# Patient Record
Sex: MALE | Race: WHITE | NOT HISPANIC OR LATINO | Employment: FULL TIME | URBAN - METROPOLITAN AREA
[De-identification: names, ages, dates, MRNs, and addresses within clinical notes are randomized per-mention and may not be internally consistent; named-entity substitution may affect disease eponyms.]

---

## 2019-02-15 ENCOUNTER — OFFICE VISIT CONVERTED (OUTPATIENT)
Dept: NEUROLOGY | Facility: CLINIC | Age: 64
End: 2019-02-15
Attending: PSYCHIATRY & NEUROLOGY

## 2019-08-21 ENCOUNTER — HOSPITAL ENCOUNTER (OUTPATIENT)
Dept: CARDIOLOGY | Facility: HOSPITAL | Age: 64
Discharge: HOME | End: 2019-08-21

## 2021-05-16 VITALS
WEIGHT: 230.31 LBS | SYSTOLIC BLOOD PRESSURE: 127 MMHG | DIASTOLIC BLOOD PRESSURE: 82 MMHG | HEIGHT: 70 IN | HEART RATE: 78 BPM | BODY MASS INDEX: 32.97 KG/M2

## 2021-10-19 ENCOUNTER — TRANSCRIBE ORDERS (OUTPATIENT)
Dept: CARDIOLOGY | Facility: HOSPITAL | Age: 66
End: 2021-10-19

## 2021-10-19 ENCOUNTER — HOSPITAL ENCOUNTER (OUTPATIENT)
Dept: CARDIOLOGY | Facility: HOSPITAL | Age: 66
Discharge: HOME | End: 2021-10-19
Admitting: REGISTERED NURSE

## 2021-10-19 DIAGNOSIS — F90.0 ADHD, PREDOMINANTLY INATTENTIVE TYPE: Primary | ICD-10-CM

## 2021-10-19 DIAGNOSIS — F90.0 ADHD, PREDOMINANTLY INATTENTIVE TYPE: ICD-10-CM

## 2021-10-19 DIAGNOSIS — Z79.899 ENCOUNTER FOR LONG-TERM (CURRENT) USE OF OTHER MEDICATIONS: ICD-10-CM

## 2021-10-19 PROCEDURE — 93005 ELECTROCARDIOGRAM TRACING: CPT | Performed by: REGISTERED NURSE

## 2021-10-25 LAB — QT INTERVAL: 354 MS

## 2021-11-15 ENCOUNTER — OFFICE VISIT (OUTPATIENT)
Dept: CARDIOLOGY | Facility: CLINIC | Age: 66
End: 2021-11-15

## 2021-11-15 VITALS
DIASTOLIC BLOOD PRESSURE: 88 MMHG | HEIGHT: 70 IN | BODY MASS INDEX: 33.36 KG/M2 | SYSTOLIC BLOOD PRESSURE: 143 MMHG | HEART RATE: 86 BPM | WEIGHT: 233 LBS

## 2021-11-15 DIAGNOSIS — R94.31 ABNORMAL ECG: Primary | ICD-10-CM

## 2021-11-15 DIAGNOSIS — F90.9 ATTENTION DEFICIT HYPERACTIVITY DISORDER (ADHD), UNSPECIFIED ADHD TYPE: ICD-10-CM

## 2021-11-15 DIAGNOSIS — I10 ESSENTIAL HYPERTENSION: ICD-10-CM

## 2021-11-15 PROCEDURE — 99203 OFFICE O/P NEW LOW 30 MIN: CPT | Performed by: INTERNAL MEDICINE

## 2021-11-15 RX ORDER — LISINOPRIL 10 MG/1
10 TABLET ORAL DAILY
COMMUNITY
Start: 2021-06-14 | End: 2023-01-16 | Stop reason: SDUPTHER

## 2021-11-15 RX ORDER — ASPIRIN 325 MG
325 TABLET ORAL DAILY
COMMUNITY

## 2021-11-15 NOTE — PROGRESS NOTES
"Chief Complaint  Abnormal ECG      History of Present Illness  Alec Dunlap presents to [facility]    This is a very pleasant 66-year-old gentleman with hypertension was referred to clinic for evaluation of abnormal ECG.  He has ADHD.  His PCP was going to start him on Adderall but noticed that his ECG was abnormal demonstrating sinus rhythm with possible old inferior infarct.  He was referred to us for further evaluation.  He denies previous cardiac history.  He has no cardiac complaints.  He remains physically active without extraordinary limitations.  He has no chest discomfort, dyspnea, orthopnea, edema, palpitations, presyncope or syncope.      Past Medical History:   Diagnosis Date   • Hypertension    • Stroke (HCC)          Current Outpatient Medications:   •  aspirin 325 MG tablet, Take 325 mg by mouth Daily., Disp: , Rfl:   •  lisinopril (PRINIVIL,ZESTRIL) 10 MG tablet, Take 10 mg by mouth Daily., Disp: , Rfl:     There are no discontinued medications.  Allergies   Allergen Reactions   • Gabapentin Rash   • Pregabalin Rash        Social History     Tobacco Use   • Smoking status: Former Smoker     Types: Cigarettes     Quit date: 11/15/1975     Years since quittin.0   • Smokeless tobacco: Never Used   Vaping Use   • Vaping Use: Never used   Substance Use Topics   • Alcohol use: Yes     Alcohol/week: 7.0 standard drinks     Types: 7 Glasses of wine per week   • Drug use: Never       Family History   Problem Relation Age of Onset   • Heart disease Father         Objective     /88   Pulse 86   Ht 177.8 cm (70\")   Wt 106 kg (233 lb)   BMI 33.43 kg/m²       Physical Exam  Constitutional:       General: Awake. Not in acute distress.     Appearance: Normal appearance.   Neck:      Vascular: No carotid bruit, hepatojugular reflux or JVD.   Cardiovascular:      Rate and Rhythm: Normal rate and regular rhythm.      Chest Wall: PMI is not displaced.      Heart sounds: " Normal heart sounds, S1 normal and S2 normal. No murmur heard.   No friction rub. No gallop. No S3 or S4 sounds.    Pulmonary:      Effort: Pulmonary effort is normal.      Breath sounds: Normal breath sounds. No wheezing, rhonchi or rales.   Ext.      Right lower leg: No edema.      Left lower leg: No edema.   Skin:     General: Skin is warm and dry.      Coloration: Skin is not cyanotic.      Findings: No petechiae or rash.   Neurological:      Mental Status: Alert and oriented x 3  Psychiatric:         Behavior: Behavior is cooperative.       Result Review :     No results found for: PROBNP       Lab Results   Component Value Date    TSH 1.100 01/17/2019      Lab Results   Component Value Date    FREET4 1.1 01/17/2019      No results found for: DDIMERQUANT  No results found for: MG   No results found for: DIGOXIN   Lab Results   Component Value Date    TROPONINT <0.01 01/17/2019      No results found for: POCTROP(                   No results found for this or any previous visit.                Diagnoses and all orders for this visit:    1. Abnormal ECG (Primary)    2. Attention deficit hyperactivity disorder (ADHD), unspecified ADHD type    3. Essential hypertension    4. History of CVA (cerebrovascular accident)      Assessment:    -Abnormal ECG demonstrating sinus rhythm with old inferior infarct.  This is unchanged compared to previous ECG from 2019.  At that time, he underwent stress echocardiogram which was unremarkable without ischemic ST-T changes or wall motion abnormalities.  LVEF is normal.  His exam today is benign.  He has no angina or angina-like symptoms.  No further cardiac work-up appears to be indicated at this time.  Monitor clinically.    -ADHD: Patient is planned to be started on Adderall which is okay from a cardiac perspective.  Side effects of this medication including tachycardia, palpitations, dizziness, chest discomfort and shortness of breath were discussed with the patient.  He was  advised to stop this medication and report to us if he experiences any of the these side effects.    -Hypertension: His blood pressure is mildly elevated.  Continue lisinopril.  Continue blood pressure monitoring.  Antihypertensive regimen will need to be adjusted if blood pressure remains elevated.      Patient will return to clinic in 1 year or sooner if needed    Follow Up       Return in about 1 year (around 11/15/2022).    Patient was given instructions and counseling regarding his condition or for health maintenance advice. Please see specific information pulled into the AVS if appropriate.

## 2021-11-18 ENCOUNTER — PATIENT ROUNDING (BHMG ONLY) (OUTPATIENT)
Dept: CARDIOLOGY | Facility: CLINIC | Age: 66
End: 2021-11-18

## 2021-11-18 ENCOUNTER — TELEPHONE (OUTPATIENT)
Dept: CARDIOLOGY | Facility: CLINIC | Age: 66
End: 2021-11-18

## 2021-11-18 NOTE — PROGRESS NOTES
November 18, 2021    Hello, may I speak with Alec Dunlap?    My name is  DON ALEX     I am  with [facility]  [address] [place] [id number] [phone].    Before we get started may I verify your date of birth? 1955    I am calling to officially welcome you to our practice and ask about your recent visit. Is this a good time to talk? yes    Tell me about your visit with us. What things went well? Patient stated that the visit went very well.       We're always looking for ways to make our patients' experiences even better. Do you have recommendations on ways we may improve?  no. The patient stated that there is nothing that they would change.    Overall were you satisfied with your first visit to our practice? yes       I appreciate you taking the time to speak with me today. Is there anything else I can do for you? no      Thank you, and have a great day.

## 2022-06-07 ENCOUNTER — TRANSCRIBE ORDERS (OUTPATIENT)
Dept: ADMINISTRATIVE | Facility: HOSPITAL | Age: 67
End: 2022-06-07

## 2022-06-07 DIAGNOSIS — Z86.73 HISTORY OF STROKE: ICD-10-CM

## 2022-06-07 DIAGNOSIS — R42 DIZZINESS: Primary | ICD-10-CM

## 2022-06-08 ENCOUNTER — APPOINTMENT (OUTPATIENT)
Dept: CARDIOLOGY | Facility: HOSPITAL | Age: 67
End: 2022-06-08

## 2022-06-09 ENCOUNTER — HOSPITAL ENCOUNTER (OUTPATIENT)
Dept: CARDIOLOGY | Facility: HOSPITAL | Age: 67
Discharge: HOME | End: 2022-06-09
Admitting: NURSE PRACTITIONER

## 2022-06-09 DIAGNOSIS — R42 DIZZINESS: ICD-10-CM

## 2022-06-09 DIAGNOSIS — Z86.73 HISTORY OF STROKE: ICD-10-CM

## 2022-06-09 LAB
BH CV XLRA MEAS LEFT CAROTID BULB EDV: 18 CM/SEC
BH CV XLRA MEAS LEFT CAROTID BULB PSV: 64 CM/SEC
BH CV XLRA MEAS LEFT DIST CCA EDV: 29 CM/SEC
BH CV XLRA MEAS LEFT DIST CCA PSV: 94 CM/SEC
BH CV XLRA MEAS LEFT DIST ICA EDV: 20 CM/SEC
BH CV XLRA MEAS LEFT DIST ICA PSV: 58 CM/SEC
BH CV XLRA MEAS LEFT MID ICA EDV: 27 CM/SEC
BH CV XLRA MEAS LEFT MID ICA PSV: 58 CM/SEC
BH CV XLRA MEAS LEFT PROX CCA EDV: 20 CM/SEC
BH CV XLRA MEAS LEFT PROX CCA PSV: 79 CM/SEC
BH CV XLRA MEAS LEFT PROX ECA EDV: 15 CM/SEC
BH CV XLRA MEAS LEFT PROX ECA PSV: 86 CM/SEC
BH CV XLRA MEAS LEFT PROX ICA EDV: 25 CM/SEC
BH CV XLRA MEAS LEFT PROX ICA PSV: 65 CM/SEC
BH CV XLRA MEAS LEFT VERTEBRAL A EDV: 5 CM/SEC
BH CV XLRA MEAS LEFT VERTEBRAL A PSV: 35 CM/SEC
BH CV XLRA MEAS RIGHT CAROTID BULB EDV: 17 CM/SEC
BH CV XLRA MEAS RIGHT CAROTID BULB PSV: 58 CM/SEC
BH CV XLRA MEAS RIGHT DIST CCA EDV: 24 CM/SEC
BH CV XLRA MEAS RIGHT DIST CCA PSV: 90 CM/SEC
BH CV XLRA MEAS RIGHT DIST ICA EDV: 22 CM/SEC
BH CV XLRA MEAS RIGHT DIST ICA PSV: 65 CM/SEC
BH CV XLRA MEAS RIGHT MID ICA EDV: 31 CM/SEC
BH CV XLRA MEAS RIGHT MID ICA PSV: 81 CM/SEC
BH CV XLRA MEAS RIGHT PROX CCA EDV: 17 CM/SEC
BH CV XLRA MEAS RIGHT PROX CCA PSV: 86 CM/SEC
BH CV XLRA MEAS RIGHT PROX ECA EDV: 15 CM/SEC
BH CV XLRA MEAS RIGHT PROX ECA PSV: 116 CM/SEC
BH CV XLRA MEAS RIGHT PROX ICA EDV: 26 CM/SEC
BH CV XLRA MEAS RIGHT PROX ICA PSV: 95 CM/SEC
BH CV XLRA MEAS RIGHT VERTEBRAL A EDV: 10 CM/SEC
BH CV XLRA MEAS RIGHT VERTEBRAL A PSV: 32 CM/SEC
LEFT ARM BP: NORMAL MMHG
MAXIMAL PREDICTED HEART RATE: 154 BPM
RIGHT ARM BP: NORMAL MMHG
STRESS TARGET HR: 131 BPM

## 2022-06-09 PROCEDURE — 93880 EXTRACRANIAL BILAT STUDY: CPT | Performed by: SURGERY

## 2022-06-09 PROCEDURE — 93880 EXTRACRANIAL BILAT STUDY: CPT

## 2022-06-14 ENCOUNTER — TRANSCRIBE ORDERS (OUTPATIENT)
Dept: ADMINISTRATIVE | Facility: HOSPITAL | Age: 67
End: 2022-06-14

## 2022-06-14 DIAGNOSIS — R42 DIZZINESS: Primary | ICD-10-CM

## 2022-06-29 ENCOUNTER — CLINICAL SUPPORT (OUTPATIENT)
Dept: GASTROENTEROLOGY | Facility: CLINIC | Age: 67
End: 2022-06-29

## 2022-06-29 ENCOUNTER — TELEPHONE (OUTPATIENT)
Dept: GASTROENTEROLOGY | Facility: CLINIC | Age: 67
End: 2022-06-29

## 2022-06-29 ENCOUNTER — PREP FOR SURGERY (OUTPATIENT)
Dept: OTHER | Facility: HOSPITAL | Age: 67
End: 2022-06-29

## 2022-06-29 DIAGNOSIS — R19.5 POSITIVE COLORECTAL CANCER SCREENING USING COLOGUARD TEST: Primary | ICD-10-CM

## 2022-06-29 RX ORDER — DEXTROAMPHETAMINE SACCHARATE, AMPHETAMINE ASPARTATE, DEXTROAMPHETAMINE SULFATE AND AMPHETAMINE SULFATE 2.5; 2.5; 2.5; 2.5 MG/1; MG/1; MG/1; MG/1
15 TABLET ORAL DAILY
COMMUNITY
End: 2022-09-12

## 2022-06-29 RX ORDER — DEXTROAMPHETAMINE SACCHARATE, AMPHETAMINE ASPARTATE, DEXTROAMPHETAMINE SULFATE AND AMPHETAMINE SULFATE 5; 5; 5; 5 MG/1; MG/1; MG/1; MG/1
20 TABLET ORAL DAILY
COMMUNITY
End: 2022-09-12

## 2022-06-29 NOTE — TELEPHONE ENCOUNTER
Alec Dunlap  REASON FOR CALL Colon Screening  [facility]   Past Medical History:   Diagnosis Date   • Hypertension    • Stroke (HCC)      Allergies   Allergen Reactions   • Gabapentin Rash   • Pregabalin Rash     No past surgical history on file.  Social History     Socioeconomic History   • Marital status:    Tobacco Use   • Smoking status: Former Smoker     Types: Cigarettes     Quit date: 11/15/1975     Years since quittin.6   • Smokeless tobacco: Never Used   Vaping Use   • Vaping Use: Never used   Substance and Sexual Activity   • Alcohol use: Not Currently     Alcohol/week: 7.0 standard drinks     Types: 7 Glasses of wine per week   • Drug use: Never   • Sexual activity: Defer     Family History   Problem Relation Age of Onset   • Heart disease Father    • Colon cancer Neg Hx        Current Outpatient Medications:   •  amphetamine-dextroamphetamine (ADDERALL) 10 MG tablet, Take 15 mg by mouth Daily., Disp: , Rfl:   •  amphetamine-dextroamphetamine (ADDERALL) 20 MG tablet, Take 20 mg by mouth Daily., Disp: , Rfl:   •  aspirin 325 MG tablet, Take 325 mg by mouth Daily., Disp: , Rfl:   •  lisinopril (PRINIVIL,ZESTRIL) 10 MG tablet, Take 10 mg by mouth Daily., Disp: , Rfl:

## 2022-06-30 PROBLEM — R19.5 POSITIVE COLORECTAL CANCER SCREENING USING COLOGUARD TEST: Status: ACTIVE | Noted: 2022-06-30

## 2022-07-13 ENCOUNTER — APPOINTMENT (OUTPATIENT)
Dept: CT IMAGING | Facility: HOSPITAL | Age: 67
End: 2022-07-13

## 2022-07-19 ENCOUNTER — HOSPITAL ENCOUNTER (OUTPATIENT)
Dept: CT IMAGING | Facility: HOSPITAL | Age: 67
Discharge: HOME | End: 2022-07-19
Admitting: NURSE PRACTITIONER

## 2022-07-19 DIAGNOSIS — R42 DIZZINESS: ICD-10-CM

## 2022-07-19 LAB
CREAT BLDA-MCNC: 0.8 MG/DL
EGFRCR SERPLBLD CKD-EPI 2021: 97.6 ML/MIN/1.73

## 2022-07-19 PROCEDURE — 82565 ASSAY OF CREATININE: CPT

## 2022-07-19 PROCEDURE — 70470 CT HEAD/BRAIN W/O & W/DYE: CPT

## 2022-07-19 PROCEDURE — 0 IOPAMIDOL PER 1 ML: Performed by: NURSE PRACTITIONER

## 2022-07-19 RX ADMIN — IOPAMIDOL 50 ML: 755 INJECTION, SOLUTION INTRAVENOUS at 09:28

## 2022-09-12 ENCOUNTER — OFFICE VISIT (OUTPATIENT)
Dept: INTERNAL MEDICINE | Facility: CLINIC | Age: 67
End: 2022-09-12

## 2022-09-12 VITALS
HEIGHT: 70 IN | TEMPERATURE: 97.2 F | SYSTOLIC BLOOD PRESSURE: 116 MMHG | HEART RATE: 82 BPM | BODY MASS INDEX: 30.58 KG/M2 | WEIGHT: 213.6 LBS | OXYGEN SATURATION: 98 % | DIASTOLIC BLOOD PRESSURE: 70 MMHG

## 2022-09-12 DIAGNOSIS — G45.9 TIA (TRANSIENT ISCHEMIC ATTACK): ICD-10-CM

## 2022-09-12 DIAGNOSIS — R73.01 IFG (IMPAIRED FASTING GLUCOSE): ICD-10-CM

## 2022-09-12 DIAGNOSIS — I10 HYPERTENSION, ESSENTIAL: ICD-10-CM

## 2022-09-12 DIAGNOSIS — Z12.5 SCREENING PSA (PROSTATE SPECIFIC ANTIGEN): ICD-10-CM

## 2022-09-12 DIAGNOSIS — F90.2 ATTENTION DEFICIT HYPERACTIVITY DISORDER (ADHD), COMBINED TYPE: Primary | ICD-10-CM

## 2022-09-12 PROCEDURE — 99204 OFFICE O/P NEW MOD 45 MIN: CPT | Performed by: INTERNAL MEDICINE

## 2022-09-12 RX ORDER — DEXTROAMPHETAMINE SACCHARATE, AMPHETAMINE ASPARTATE MONOHYDRATE, DEXTROAMPHETAMINE SULFATE AND AMPHETAMINE SULFATE 3.75; 3.75; 3.75; 3.75 MG/1; MG/1; MG/1; MG/1
15 CAPSULE, EXTENDED RELEASE ORAL EVERY MORNING
COMMUNITY
Start: 2022-08-23 | End: 2023-04-03

## 2022-09-12 RX ORDER — BLOOD SUGAR DIAGNOSTIC
1 STRIP MISCELLANEOUS 2 TIMES DAILY
COMMUNITY
Start: 2022-08-25 | End: 2023-04-03

## 2022-09-12 RX ORDER — CHLORAL HYDRATE 500 MG
CAPSULE ORAL
COMMUNITY

## 2022-09-12 RX ORDER — MULTIPLE VITAMINS W/ MINERALS TAB 9MG-400MCG
1 TAB ORAL DAILY
COMMUNITY

## 2022-09-12 RX ORDER — NYSTATIN 100000 U/G
1 CREAM TOPICAL 2 TIMES DAILY
COMMUNITY
End: 2023-04-03 | Stop reason: SDUPTHER

## 2022-09-12 RX ORDER — FLUCONAZOLE 100 MG/1
100 TABLET ORAL DAILY
Qty: 7 TABLET | Refills: 1 | Status: SHIPPED | OUTPATIENT
Start: 2022-09-12 | End: 2022-12-20

## 2022-09-12 RX ORDER — DEXTROAMPHETAMINE SACCHARATE, AMPHETAMINE ASPARTATE, DEXTROAMPHETAMINE SULFATE AND AMPHETAMINE SULFATE 3.75; 3.75; 3.75; 3.75 MG/1; MG/1; MG/1; MG/1
TABLET ORAL
COMMUNITY
Start: 2022-08-23 | End: 2022-12-20

## 2022-09-12 RX ORDER — CLOTRIMAZOLE AND BETAMETHASONE DIPROPIONATE 10; .64 MG/G; MG/G
1 CREAM TOPICAL 2 TIMES DAILY
Qty: 45 G | Refills: 5 | Status: SHIPPED | OUTPATIENT
Start: 2022-09-12

## 2022-09-12 NOTE — PROGRESS NOTES
"Chief Complaint/ HPI: NEEDS TO ESTABLISH, NEW DOCTOR  Patient is new to establish, uses ADHD medicines for his work, helps him a lot with focusing, also using aspirin as a preventative agent due to family history of coronary disease,    EPISODE OF DIZZINESS AND ? LOW BS --40 --      Objective   Vital Signs  Vitals:    09/12/22 0928   BP: 116/70   Pulse: 82   Temp: 97.2 °F (36.2 °C)   SpO2: 98%   Weight: 96.9 kg (213 lb 9.6 oz)   Height: 177.8 cm (70\")      Body mass index is 30.65 kg/m².  Review of Systems   Constitutional: Negative.    HENT: Negative.    Eyes: Negative.    Respiratory: Negative.    Cardiovascular: Negative.    Gastrointestinal: Negative.    Endocrine: Negative.    Genitourinary: Negative.    Musculoskeletal: Negative.    Allergic/Immunologic: Negative.    Neurological: Negative.    Hematological: Negative.    Psychiatric/Behavioral: Negative.       Physical Exam  Constitutional:       General: He is not in acute distress.     Appearance: Normal appearance.   HENT:      Head: Normocephalic.      Mouth/Throat:      Mouth: Mucous membranes are moist.   Eyes:      Conjunctiva/sclera: Conjunctivae normal.      Pupils: Pupils are equal, round, and reactive to light.   Cardiovascular:      Rate and Rhythm: Normal rate and regular rhythm.      Pulses: Normal pulses.      Heart sounds: Normal heart sounds.   Pulmonary:      Effort: Pulmonary effort is normal.      Breath sounds: Normal breath sounds.   Abdominal:      General: Abdomen is flat. Bowel sounds are normal.      Palpations: Abdomen is soft.   Musculoskeletal:         General: No swelling. Normal range of motion.      Cervical back: Neck supple.   Skin:     General: Skin is warm and dry.      Coloration: Skin is not jaundiced.   Neurological:      General: No focal deficit present.      Mental Status: He is alert and oriented to person, place, and time. Mental status is at baseline.   Psychiatric:         Mood and Affect: Mood normal.         " Behavior: Behavior normal.         Thought Content: Thought content normal.         Judgment: Judgment normal.        Result Review :   No results found for: PROBNP, BNP  CMP    CMP 7/19/22   Creatinine 0.80      Comments are available for some flowsheets but are not being displayed.           CBC w/diff    CBC w/Diff 11/30/21 5/17/22   WBC 10.03 8.59   RBC 4.65 4.70   Hemoglobin 14.1 14.0   Hematocrit 44.1 42.5   MCV 94.8 90.4   MCH 30.3 29.8   MCHC 32.0 32.9   RDW 13.7 13.3   Platelets 288 263   Neutrophil Rel % 55.5 50.9   Immature Granulocyte Rel % 0.5 0.2   Lymphocyte Rel % 25.8 30.6   Monocyte Rel % 11.1 11.6   Eosinophil Rel % 6.3 5.9   Basophil Rel % 0.8 0.8               Lab Results   Component Value Date    TSH 1.100 01/17/2019      Lab Results   Component Value Date    FREET4 1.1 01/17/2019      A1C Last 3 Results    HGBA1C Last 3 Results 6/2/22   Hemoglobin A1C 6.0 (A)   (A) Abnormal value       Comments are available for some flowsheets but are not being displayed.                             Visit Diagnoses:    ICD-10-CM ICD-9-CM   1. Attention deficit hyperactivity disorder (ADHD), combined type  F90.2 314.01   2. Hypertension, essential  I10 401.9   3. TIA (transient ischemic attack)  G45.9 435.9   4. Screening PSA (prostate specific antigen)  Z12.5 V76.44   5. IFG (impaired fasting glucose)  R73.01 790.21       Assessment and Plan   Diagnoses and all orders for this visit:    1. Attention deficit hyperactivity disorder (ADHD), combined type (Primary)  -     CBC & Differential; Future  -     Comprehensive Metabolic Panel; Future  -     Lipid Panel; Future  -     Vitamin D 25 Hydroxy; Future  -     Vitamin B12 anemia; Future  -     Folate anemia; Future  -     TSH+Free T4; Future  -     PSA Screen; Future    2. Hypertension, essential  -     CBC & Differential; Future  -     Comprehensive Metabolic Panel; Future  -     Lipid Panel; Future  -     Vitamin D 25 Hydroxy; Future  -     Vitamin B12 anemia;  Future  -     Folate anemia; Future  -     TSH+Free T4; Future  -     PSA Screen; Future    3. TIA (transient ischemic attack)  -     CBC & Differential; Future  -     Comprehensive Metabolic Panel; Future  -     Lipid Panel; Future  -     Vitamin D 25 Hydroxy; Future  -     Vitamin B12 anemia; Future  -     Folate anemia; Future  -     TSH+Free T4; Future  -     PSA Screen; Future    4. Screening PSA (prostate specific antigen)  -     CBC & Differential; Future  -     Comprehensive Metabolic Panel; Future  -     Lipid Panel; Future  -     Vitamin D 25 Hydroxy; Future  -     Vitamin B12 anemia; Future  -     Folate anemia; Future  -     TSH+Free T4; Future  -     PSA Screen; Future    5. IFG (impaired fasting glucose)    Other orders  -     fluconazole (Diflucan) 100 MG tablet; Take 1 tablet by mouth Daily.  Dispense: 7 tablet; Refill: 1  -     terbinafine (LamISIL AT) 1 % cream; Apply 1 application topically to the appropriate area as directed 2 (Two) Times a Day.  Dispense: 45 g; Refill: 0  -     clotrimazole-betamethasone (Lotrisone) 1-0.05 % cream; Apply 1 application topically to the appropriate area as directed 2 (Two) Times a Day.  Dispense: 45 g; Refill: 5    IFG    TINEA OF GROIN, ON NYSTATIN CREAM , WILL DO DIFLUCAN AND TOPICAL LAMISIL September 2022    Hypertension continues lisinopril 10 mg daily,    TIA, approximately 2018,--- takes an aspirin daily    ADHD, continues Adderall Exar 15 mg in the morning and 15 mg regular strength in the afternoon,    Bilateral partial knee replacements    Follow Up   Return in about 1 month (around 10/12/2022).  Patient was given instructions and counseling regarding his condition or for health maintenance advice. Please see specific information pulled into the AVS if appropriate.

## 2022-09-26 ENCOUNTER — LAB (OUTPATIENT)
Dept: LAB | Facility: HOSPITAL | Age: 67
End: 2022-09-26

## 2022-09-26 DIAGNOSIS — I10 HYPERTENSION, ESSENTIAL: ICD-10-CM

## 2022-09-26 DIAGNOSIS — Z12.5 SCREENING PSA (PROSTATE SPECIFIC ANTIGEN): ICD-10-CM

## 2022-09-26 DIAGNOSIS — G45.9 TIA (TRANSIENT ISCHEMIC ATTACK): ICD-10-CM

## 2022-09-26 DIAGNOSIS — F90.2 ATTENTION DEFICIT HYPERACTIVITY DISORDER (ADHD), COMBINED TYPE: ICD-10-CM

## 2022-09-26 LAB
25(OH)D3 SERPL-MCNC: 42.6 NG/ML (ref 30–100)
ALBUMIN SERPL-MCNC: 4.6 G/DL (ref 3.5–5.2)
ALBUMIN/GLOB SERPL: 1.6 G/DL
ALP SERPL-CCNC: 65 U/L (ref 39–117)
ALT SERPL W P-5'-P-CCNC: 23 U/L (ref 1–41)
ANION GAP SERPL CALCULATED.3IONS-SCNC: 10.3 MMOL/L (ref 5–15)
AST SERPL-CCNC: 19 U/L (ref 1–40)
BASOPHILS # BLD AUTO: 0.07 10*3/MM3 (ref 0–0.2)
BASOPHILS NFR BLD AUTO: 0.9 % (ref 0–1.5)
BILIRUB SERPL-MCNC: 0.6 MG/DL (ref 0–1.2)
BUN SERPL-MCNC: 14 MG/DL (ref 8–23)
BUN/CREAT SERPL: 15.7 (ref 7–25)
CALCIUM SPEC-SCNC: 9.6 MG/DL (ref 8.6–10.5)
CHLORIDE SERPL-SCNC: 104 MMOL/L (ref 98–107)
CHOLEST SERPL-MCNC: 191 MG/DL (ref 0–200)
CO2 SERPL-SCNC: 24.7 MMOL/L (ref 22–29)
CREAT SERPL-MCNC: 0.89 MG/DL (ref 0.76–1.27)
DEPRECATED RDW RBC AUTO: 41.6 FL (ref 37–54)
EGFRCR SERPLBLD CKD-EPI 2021: 93.9 ML/MIN/1.73
EOSINOPHIL # BLD AUTO: 0.53 10*3/MM3 (ref 0–0.4)
EOSINOPHIL NFR BLD AUTO: 6.6 % (ref 0.3–6.2)
ERYTHROCYTE [DISTWIDTH] IN BLOOD BY AUTOMATED COUNT: 12.7 % (ref 12.3–15.4)
GLOBULIN UR ELPH-MCNC: 2.9 GM/DL
GLUCOSE SERPL-MCNC: 102 MG/DL (ref 65–99)
HCT VFR BLD AUTO: 42.5 % (ref 37.5–51)
HDLC SERPL-MCNC: 65 MG/DL (ref 40–60)
HGB BLD-MCNC: 14.3 G/DL (ref 13–17.7)
IMM GRANULOCYTES # BLD AUTO: 0.04 10*3/MM3 (ref 0–0.05)
IMM GRANULOCYTES NFR BLD AUTO: 0.5 % (ref 0–0.5)
LDLC SERPL CALC-MCNC: 114 MG/DL (ref 0–100)
LDLC/HDLC SERPL: 1.73 {RATIO}
LYMPHOCYTES # BLD AUTO: 2.92 10*3/MM3 (ref 0.7–3.1)
LYMPHOCYTES NFR BLD AUTO: 36.2 % (ref 19.6–45.3)
MCH RBC QN AUTO: 29.9 PG (ref 26.6–33)
MCHC RBC AUTO-ENTMCNC: 33.6 G/DL (ref 31.5–35.7)
MCV RBC AUTO: 88.7 FL (ref 79–97)
MONOCYTES # BLD AUTO: 0.85 10*3/MM3 (ref 0.1–0.9)
MONOCYTES NFR BLD AUTO: 10.5 % (ref 5–12)
NEUTROPHILS NFR BLD AUTO: 3.66 10*3/MM3 (ref 1.7–7)
NEUTROPHILS NFR BLD AUTO: 45.3 % (ref 42.7–76)
NRBC BLD AUTO-RTO: 0 /100 WBC (ref 0–0.2)
PLATELET # BLD AUTO: 269 10*3/MM3 (ref 140–450)
PMV BLD AUTO: 11.6 FL (ref 6–12)
POTASSIUM SERPL-SCNC: 4.4 MMOL/L (ref 3.5–5.2)
PROT SERPL-MCNC: 7.5 G/DL (ref 6–8.5)
PSA SERPL-MCNC: 6.99 NG/ML (ref 0–4)
RBC # BLD AUTO: 4.79 10*6/MM3 (ref 4.14–5.8)
SODIUM SERPL-SCNC: 139 MMOL/L (ref 136–145)
T4 FREE SERPL-MCNC: 0.97 NG/DL (ref 0.93–1.7)
TRIGL SERPL-MCNC: 67 MG/DL (ref 0–150)
TSH SERPL DL<=0.05 MIU/L-ACNC: 1.35 UIU/ML (ref 0.27–4.2)
VIT B12 BLD-MCNC: 877 PG/ML (ref 211–946)
VLDLC SERPL-MCNC: 12 MG/DL (ref 5–40)
WBC NRBC COR # BLD: 8.07 10*3/MM3 (ref 3.4–10.8)

## 2022-09-26 PROCEDURE — 82306 VITAMIN D 25 HYDROXY: CPT

## 2022-09-26 PROCEDURE — 80061 LIPID PANEL: CPT

## 2022-09-26 PROCEDURE — 36415 COLL VENOUS BLD VENIPUNCTURE: CPT

## 2022-09-26 PROCEDURE — G0103 PSA SCREENING: HCPCS

## 2022-09-26 PROCEDURE — 82607 VITAMIN B-12: CPT

## 2022-09-26 PROCEDURE — 80050 GENERAL HEALTH PANEL: CPT

## 2022-09-26 PROCEDURE — 84439 ASSAY OF FREE THYROXINE: CPT

## 2022-10-10 ENCOUNTER — OFFICE VISIT (OUTPATIENT)
Dept: INTERNAL MEDICINE | Facility: CLINIC | Age: 67
End: 2022-10-10

## 2022-10-10 VITALS
OXYGEN SATURATION: 97 % | HEIGHT: 70 IN | DIASTOLIC BLOOD PRESSURE: 80 MMHG | SYSTOLIC BLOOD PRESSURE: 141 MMHG | TEMPERATURE: 97.3 F | WEIGHT: 213.2 LBS | BODY MASS INDEX: 30.52 KG/M2 | HEART RATE: 64 BPM

## 2022-10-10 DIAGNOSIS — Z12.5 SCREENING PSA (PROSTATE SPECIFIC ANTIGEN): ICD-10-CM

## 2022-10-10 DIAGNOSIS — R19.5 POSITIVE COLORECTAL CANCER SCREENING USING COLOGUARD TEST: ICD-10-CM

## 2022-10-10 DIAGNOSIS — R73.01 IFG (IMPAIRED FASTING GLUCOSE): ICD-10-CM

## 2022-10-10 DIAGNOSIS — F90.2 ATTENTION DEFICIT HYPERACTIVITY DISORDER (ADHD), COMBINED TYPE: Primary | ICD-10-CM

## 2022-10-10 DIAGNOSIS — I10 HYPERTENSION, ESSENTIAL: ICD-10-CM

## 2022-10-10 DIAGNOSIS — G45.9 TIA (TRANSIENT ISCHEMIC ATTACK): ICD-10-CM

## 2022-10-10 PROCEDURE — 99214 OFFICE O/P EST MOD 30 MIN: CPT | Performed by: INTERNAL MEDICINE

## 2022-10-10 RX ORDER — DEXAMETHASONE 4 MG/1
4 TABLET ORAL 2 TIMES DAILY WITH MEALS
Qty: 10 TABLET | Refills: 0 | Status: SHIPPED | OUTPATIENT
Start: 2022-10-10 | End: 2023-04-03

## 2022-10-10 RX ORDER — CETIRIZINE HYDROCHLORIDE 10 MG/1
10 TABLET ORAL DAILY
Qty: 30 TABLET | Refills: 0 | Status: SHIPPED | OUTPATIENT
Start: 2022-10-10

## 2022-10-10 RX ORDER — FAMOTIDINE 40 MG/1
40 TABLET, FILM COATED ORAL NIGHTLY
Qty: 30 TABLET | Refills: 0 | Status: SHIPPED | OUTPATIENT
Start: 2022-10-10 | End: 2022-11-11

## 2022-10-10 RX ORDER — ROSUVASTATIN CALCIUM 5 MG/1
5 TABLET, COATED ORAL DAILY
Qty: 90 TABLET | Refills: 3 | Status: SHIPPED | OUTPATIENT
Start: 2022-10-10 | End: 2023-01-16 | Stop reason: SDUPTHER

## 2022-10-10 NOTE — PROGRESS NOTES
"Answers for HPI/ROS submitted by the patient on 10/3/2022  What is the primary reason for your visit?: Other  Please describe your symptoms.: test results  Have you had these symptoms before?: No  How long have you been having these symptoms?: Greater than 2 weeks    Chief Complaint/ HPI: Patient is here to follow-up with lab work, cholesterol blood sugar concerns, tells me his PSA has been high in the past, he also was at a friend's house over the weekend developed a rash itching red spots all over,          Objective   Vital Signs  Vitals:    10/10/22 1112   BP: 141/80   BP Location: Left arm   Patient Position: Sitting   Cuff Size: Large Adult   Pulse: 64   Temp: 97.3 °F (36.3 °C)   SpO2: 97%   Weight: 96.7 kg (213 lb 3.2 oz)   Height: 177.8 cm (70\")      Body mass index is 30.59 kg/m².  Review of Systems rash, diffuse, itching,  Physical Exam  HENT:      Head: Normocephalic.   Cardiovascular:      Rate and Rhythm: Normal rate.      Pulses: Normal pulses.      Heart sounds: Normal heart sounds.   Pulmonary:      Effort: Pulmonary effort is normal.      Breath sounds: Normal breath sounds.   Musculoskeletal:      Cervical back: Normal range of motion.   Skin:     General: Skin is warm.      Findings: Rash present.   Neurological:      General: No focal deficit present.      Mental Status: He is alert.   Psychiatric:         Mood and Affect: Mood normal.      patient has multiple different areas of raised papular lesions swelling, left elbow left wrist, upper back area bilateral and right forehead area,  Result Review :   No results found for: PROBNP, BNP  CMP    CMP 7/19/22 9/26/22   Glucose  102 (A)   BUN  14   Creatinine 0.80 0.89   Sodium  139   Potassium  4.4   Chloride  104   Calcium  9.6   Albumin  4.60   Total Bilirubin  0.6   Alkaline Phosphatase  65   AST (SGOT)  19   ALT (SGPT)  23   (A) Abnormal value       Comments are available for some flowsheets but are not being displayed.           CBC w/diff  "   CBC w/Diff 11/30/21 5/17/22 9/26/22   WBC 10.03 8.59 8.07   RBC 4.65 4.70 4.79   Hemoglobin 14.1 14.0 14.3   Hematocrit 44.1 42.5 42.5   MCV 94.8 90.4 88.7   MCH 30.3 29.8 29.9   MCHC 32.0 32.9 33.6   RDW 13.7 13.3 12.7   Platelets 288 263 269   Neutrophil Rel % 55.5 50.9 45.3   Immature Granulocyte Rel % 0.5 0.2 0.5   Lymphocyte Rel % 25.8 30.6 36.2   Monocyte Rel % 11.1 11.6 10.5   Eosinophil Rel % 6.3 5.9 6.6 (A)   Basophil Rel % 0.8 0.8 0.9   (A) Abnormal value             Lipid Panel    Lipid Panel 9/26/22   Total Cholesterol 191   Triglycerides 67   HDL Cholesterol 65 (A)   VLDL Cholesterol 12   LDL Cholesterol  114 (A)   LDL/HDL Ratio 1.73   (A) Abnormal value             Lab Results   Component Value Date    TSH 1.350 09/26/2022    TSH 1.100 01/17/2019      Lab Results   Component Value Date    FREET4 0.97 09/26/2022    FREET4 1.1 01/17/2019      A1C Last 3 Results    HGBA1C Last 3 Results 6/2/22   Hemoglobin A1C 6.0 (A)   (A) Abnormal value       Comments are available for some flowsheets but are not being displayed.            PSA    PSA 9/26/22   PSA 6.990 (A)   (A) Abnormal value                           Visit Diagnoses:    ICD-10-CM ICD-9-CM   1. Attention deficit hyperactivity disorder (ADHD), combined type  F90.2 314.01   2. Screening PSA (prostate specific antigen)  Z12.5 V76.44   3. Positive colorectal cancer screening using Cologuard test  R19.5 787.7   4. TIA (transient ischemic attack)  G45.9 435.9   5. IFG (impaired fasting glucose)  R73.01 790.21   6. Hypertension, essential  I10 401.9       Assessment and Plan   Diagnoses and all orders for this visit:    1. Attention deficit hyperactivity disorder (ADHD), combined type (Primary)  -     Ambulatory Referral to Urology  -     CBC & Differential; Future  -     Comprehensive Metabolic Panel; Future  -     Lipid Panel; Future  -     PSA DIAGNOSTIC; Future    2. Screening PSA (prostate specific antigen)  -     Ambulatory Referral to Urology  -      CBC & Differential; Future  -     Comprehensive Metabolic Panel; Future  -     Lipid Panel; Future  -     PSA DIAGNOSTIC; Future    3. Positive colorectal cancer screening using Cologuard test  -     Ambulatory Referral to Urology  -     CBC & Differential; Future  -     Comprehensive Metabolic Panel; Future  -     Lipid Panel; Future  -     PSA DIAGNOSTIC; Future    4. TIA (transient ischemic attack)  -     Ambulatory Referral to Urology  -     CBC & Differential; Future  -     Comprehensive Metabolic Panel; Future  -     Lipid Panel; Future  -     PSA DIAGNOSTIC; Future    5. IFG (impaired fasting glucose)  -     Ambulatory Referral to Urology  -     CBC & Differential; Future  -     Comprehensive Metabolic Panel; Future  -     Lipid Panel; Future  -     PSA DIAGNOSTIC; Future    6. Hypertension, essential  -     Ambulatory Referral to Urology  -     CBC & Differential; Future  -     Comprehensive Metabolic Panel; Future  -     Lipid Panel; Future  -     PSA DIAGNOSTIC; Future    Other orders  -     rosuvastatin (Crestor) 5 MG tablet; Take 1 tablet by mouth Daily.  Dispense: 90 tablet; Refill: 3  -     dexamethasone (DECADRON) 4 MG tablet; Take 1 tablet by mouth 2 (Two) Times a Day With Meals.  Dispense: 10 tablet; Refill: 0  -     famotidine (Pepcid) 40 MG tablet; Take 1 tablet by mouth Every Night.  Dispense: 30 tablet; Refill: 0  -     cetirizine (zyrTEC) 10 MG tablet; Take 1 tablet by mouth Daily.  Dispense: 30 tablet; Refill: 0    Rash, possible insect bites, allergic reaction, multiple on various areas head neck arms, October 10, 2022 treatment options discussed, we will send in a prescription for Zyrtec, Pepcid nightly, and Decadron, cautioned on elevated blood sugars, October 10, 2022    IFG discussed, patient has follow-up with endocrinology through [facility] sometime in the near future, November 2022, for possibly low blood sugars hypoglycemia after eating etc.,    TINEA OF GROIN, patient finished course  of Diflucan and Lamisil with good results September 2022,    Hypertension continues lisinopril 10 mg daily,    TIA, approximately 2018,--- takes an aspirin daily--discussions about statin usage, October 10, 2022--- we will start Crestor 5 mg daily, patient is to start co-Q10    Elevated PSA==6.9 oct 2022, ---potential dx and rx options patient will make and we will make referral to Dr. Valadez for second opinion may need MRI prostate,    ADHD, continues Adderall xr 15 mg in the morning and 15 mg regular strength in the afternoon,    Bilateral partial knee replacements    Follow Up   Return in about 6 months (around 4/10/2023).  Patient was given instructions and counseling regarding his condition or for health maintenance advice. Please see specific information pulled into the AVS if appropriate.

## 2022-10-18 ENCOUNTER — HOSPITAL ENCOUNTER (OUTPATIENT)
Facility: HOSPITAL | Age: 67
Setting detail: HOSPITAL OUTPATIENT SURGERY
Discharge: HOME | End: 2022-10-18
Attending: INTERNAL MEDICINE | Admitting: INTERNAL MEDICINE

## 2022-10-18 ENCOUNTER — ANESTHESIA (OUTPATIENT)
Dept: GASTROENTEROLOGY | Facility: HOSPITAL | Age: 67
End: 2022-10-18

## 2022-10-18 ENCOUNTER — ANESTHESIA EVENT (OUTPATIENT)
Dept: GASTROENTEROLOGY | Facility: HOSPITAL | Age: 67
End: 2022-10-18

## 2022-10-18 VITALS
SYSTOLIC BLOOD PRESSURE: 132 MMHG | HEIGHT: 70 IN | DIASTOLIC BLOOD PRESSURE: 71 MMHG | HEART RATE: 57 BPM | WEIGHT: 207.23 LBS | RESPIRATION RATE: 12 BRPM | TEMPERATURE: 97 F | OXYGEN SATURATION: 96 % | BODY MASS INDEX: 29.67 KG/M2

## 2022-10-18 DIAGNOSIS — R19.5 POSITIVE COLORECTAL CANCER SCREENING USING COLOGUARD TEST: ICD-10-CM

## 2022-10-18 PROCEDURE — 88305 TISSUE EXAM BY PATHOLOGIST: CPT | Performed by: INTERNAL MEDICINE

## 2022-10-18 PROCEDURE — 25010000002 PROPOFOL 10 MG/ML EMULSION: Performed by: NURSE ANESTHETIST, CERTIFIED REGISTERED

## 2022-10-18 PROCEDURE — 45385 COLONOSCOPY W/LESION REMOVAL: CPT | Performed by: INTERNAL MEDICINE

## 2022-10-18 PROCEDURE — 45388 COLONOSCOPY W/ABLATION: CPT | Performed by: INTERNAL MEDICINE

## 2022-10-18 RX ORDER — LIDOCAINE HYDROCHLORIDE 20 MG/ML
INJECTION, SOLUTION EPIDURAL; INFILTRATION; INTRACAUDAL; PERINEURAL AS NEEDED
Status: DISCONTINUED | OUTPATIENT
Start: 2022-10-18 | End: 2022-10-18 | Stop reason: SURG

## 2022-10-18 RX ORDER — SODIUM CHLORIDE, SODIUM LACTATE, POTASSIUM CHLORIDE, CALCIUM CHLORIDE 600; 310; 30; 20 MG/100ML; MG/100ML; MG/100ML; MG/100ML
30 INJECTION, SOLUTION INTRAVENOUS CONTINUOUS
Status: DISCONTINUED | OUTPATIENT
Start: 2022-10-18 | End: 2022-10-18 | Stop reason: HOSPADM

## 2022-10-18 RX ORDER — PROPOFOL 10 MG/ML
VIAL (ML) INTRAVENOUS AS NEEDED
Status: DISCONTINUED | OUTPATIENT
Start: 2022-10-18 | End: 2022-10-18 | Stop reason: SURG

## 2022-10-18 RX ORDER — PROPOFOL 10 MG/ML
VIAL (ML) INTRAVENOUS AS NEEDED
Status: DISCONTINUED | OUTPATIENT
Start: 2022-10-18 | End: 2022-10-18

## 2022-10-18 RX ADMIN — LIDOCAINE HYDROCHLORIDE 100 MG: 20 INJECTION, SOLUTION EPIDURAL; INFILTRATION; INTRACAUDAL; PERINEURAL at 09:55

## 2022-10-18 RX ADMIN — PROPOFOL 200 MCG/KG/MIN: 10 INJECTION, EMULSION INTRAVENOUS at 09:55

## 2022-10-18 RX ADMIN — SODIUM CHLORIDE, POTASSIUM CHLORIDE, SODIUM LACTATE AND CALCIUM CHLORIDE 30 ML/HR: 600; 310; 30; 20 INJECTION, SOLUTION INTRAVENOUS at 09:44

## 2022-10-18 RX ADMIN — PROPOFOL 100 MG: 10 INJECTION, EMULSION INTRAVENOUS at 09:55

## 2022-10-18 NOTE — H&P
Pre Procedure History & Physical    Chief Complaint:   Positive Cologuard    Subjective     HPI:   Positive Cologuard    Past Medical History:   Past Medical History:   Diagnosis Date   • ADHD (attention deficit hyperactivity disorder) 3/01/2021   • Colon polyp last visit   • Erectile dysfunction 01/01/2019   • Hypertension    • Infectious viral hepatitis i was about 10 years old   • Stroke (HCC)        Past Surgical History:  Past Surgical History:   Procedure Laterality Date   • CARPAL TUNNEL RELEASE     • COLONOSCOPY  7 years   • HERNIA REPAIR     • JOINT REPLACEMENT  2018   • KNEE ARTHROPLASTY, PARTIAL REPLACEMENT Bilateral    • SHOULDER SURGERY     • VASECTOMY  1982       Family History:  Family History   Problem Relation Age of Onset   • Heart disease Father    • Colon cancer Neg Hx        Social History:   reports that he quit smoking about 43 years ago. His smoking use included cigarettes. He started smoking about 49 years ago. He has a 14.00 pack-year smoking history. He has never used smokeless tobacco. He reports that he does not currently use alcohol after a past usage of about 6.0 standard drinks per week. He reports that he does not use drugs.    Medications:   Medications Prior to Admission   Medication Sig Dispense Refill Last Dose   • Accu-Chek Diana Plus test strip 1 each by Other route 2 (Two) Times a Day.      • amphetamine-dextroamphetamine (ADDERALL) 15 MG tablet TAKE 1 TABLET BY MOUTH EVERY DAY IN THE AFTERNOON      • amphetamine-dextroamphetamine XR (ADDERALL XR) 15 MG 24 hr capsule Take 15 mg by mouth Every Morning      • aspirin 325 MG tablet Take 325 mg by mouth Daily.      • cetirizine (zyrTEC) 10 MG tablet Take 1 tablet by mouth Daily. 30 tablet 0    • clotrimazole-betamethasone (Lotrisone) 1-0.05 % cream Apply 1 application topically to the appropriate area as directed 2 (Two) Times a Day. 45 g 5    • dexamethasone (DECADRON) 4 MG tablet Take 1 tablet by mouth 2 (Two) Times a Day With  "Meals. 10 tablet 0    • famotidine (Pepcid) 40 MG tablet Take 1 tablet by mouth Every Night. 30 tablet 0    • fluconazole (Diflucan) 100 MG tablet Take 1 tablet by mouth Daily. 7 tablet 1    • lisinopril (PRINIVIL,ZESTRIL) 10 MG tablet Take 10 mg by mouth Daily.      • multivitamin with minerals tablet tablet Take 1 tablet by mouth Daily.      • nystatin (MYCOSTATIN) 018013 UNIT/GM cream Apply 1 application topically to the appropriate area as directed 2 (Two) Times a Day.      • Omega-3 Fatty Acids (fish oil) 1000 MG capsule capsule Take  by mouth Daily With Breakfast.      • rosuvastatin (Crestor) 5 MG tablet Take 1 tablet by mouth Daily. 90 tablet 3    • terbinafine (LamISIL AT) 1 % cream Apply 1 application topically to the appropriate area as directed 2 (Two) Times a Day. 45 g 0        Allergies:  Gabapentin and Pregabalin        Objective     Height 177.8 cm (70\"), weight 94 kg (207 lb 3.7 oz).    Physical Exam   Constitutional: Pt is oriented to person, place, and time and well-developed, well-nourished, and in no distress.   Mouth/Throat: Oropharynx is clear and moist.   Neck: Normal range of motion.   Cardiovascular: Normal rate, regular rhythm and normal heart sounds.    Pulmonary/Chest: Effort normal and breath sounds normal.   Abdominal: Soft. Nontender  Skin: Skin is warm and dry.   Psychiatric: Mood, memory, affect and judgment normal.     Assessment & Plan     Diagnosis:  Positive Cologuard    Anticipated Surgical Procedure:  Colonoscopy    The risks, benefits, and alternatives of this procedure have been discussed with the patient or the responsible party- the patient understands and agrees to proceed.            "

## 2022-10-18 NOTE — ANESTHESIA POSTPROCEDURE EVALUATION
Patient: Alec Dunlap    Procedure Summary     Date: 10/18/22 Room / Location: [facility] ENDOSCOPY 1 / [facility] ENDOSCOPY    Anesthesia Start: 0953 Anesthesia Stop: 1021    Procedure: COLONOSCOPY with APC COAGULTION, AND HOT SNARE POLYPECTOMY Diagnosis:       Positive colorectal cancer screening using Cologuard test      (Positive colorectal cancer screening using Cologuard test [R19.5])    Surgeons: Jb Escoto MD Provider: Chuck Guerrero MD    Anesthesia Type: general ASA Status: 3          Anesthesia Type: general    Vitals  Vitals Value Taken Time   /71 10/18/22 1034   Temp 36 °C (96.8 °F) 10/18/22 1019   Pulse 57 10/18/22 1034   Resp 12 10/18/22 1034   SpO2 96 % 10/18/22 1034           Post Anesthesia Care and Evaluation    Patient location during evaluation: bedside  Patient participation: complete - patient participated  Level of consciousness: awake  Pain management: adequate    Airway patency: patent  Anesthetic complications: No anesthetic complications  PONV Status: none  Cardiovascular status: acceptable and stable  Respiratory status: acceptable  Hydration status: acceptable    Comments: An Anesthesiologist personally participated in the most demanding procedures (including induction and emergence if applicable) in the anesthesia plan, monitored the course of anesthesia administration at frequent intervals and remained physically present and available for immediate diagnosis and treatment of emergencies.

## 2022-10-18 NOTE — ANESTHESIA PREPROCEDURE EVALUATION
Anesthesia Evaluation     Patient summary reviewed and Nursing notes reviewed   no history of anesthetic complications:  NPO Solid Status: > 8 hours  NPO Liquid Status: > 2 hours           Airway   Mallampati: I  TM distance: >3 FB  Neck ROM: full  No difficulty expected  Dental      Pulmonary - negative pulmonary ROS and normal exam    breath sounds clear to auscultation  Cardiovascular - normal exam  Exercise tolerance: good (4-7 METS)    Rhythm: regular  Rate: normal    (+) hypertension,       Neuro/Psych  (+) TIA, CVA, psychiatric history,    GI/Hepatic/Renal/Endo    (+)   hepatitis, liver disease,     Musculoskeletal (-) negative ROS    Abdominal    Substance History - negative use     OB/GYN negative ob/gyn ROS         Other - negative ROS       ROS/Med Hx Other: PAT Nursing Notes unavailable.     ekg reveals potential old inferior infarct, pt does not know any instance with cp, sob, or passing out episodes         ABNORMAL ECG -  Sinus rhythm  Abnormal R-wave progression, early transition  Inferior infarct, old             Anesthesia Plan    ASA 3     general     (Total IV Anesthesia    Patient understands anesthesia not responsible for dental damage.  )  intravenous induction     Anesthetic plan, risks, benefits, and alternatives have been provided, discussed and informed consent has been obtained with: patient.    Plan discussed with CRNA.        CODE STATUS:

## 2022-10-19 LAB
CYTO UR: NORMAL
LAB AP CASE REPORT: NORMAL
LAB AP CLINICAL INFORMATION: NORMAL
PATH REPORT.FINAL DX SPEC: NORMAL
PATH REPORT.GROSS SPEC: NORMAL

## 2022-11-11 RX ORDER — FAMOTIDINE 40 MG/1
40 TABLET, FILM COATED ORAL NIGHTLY
Qty: 30 TABLET | Refills: 0 | Status: SHIPPED | OUTPATIENT
Start: 2022-11-11 | End: 2023-03-03 | Stop reason: SDUPTHER

## 2022-11-17 ENCOUNTER — TELEPHONE (OUTPATIENT)
Dept: UROLOGY | Facility: CLINIC | Age: 67
End: 2022-11-17

## 2022-11-20 PROBLEM — R97.20 ELEVATED PSA: Status: ACTIVE | Noted: 2022-11-20

## 2022-11-20 NOTE — PROGRESS NOTES
Chief Complaint: Urologic complaint    Subjective         History of Present Illness  Alec Dunlap is a 67 y.o. male       Elevated PSA      Voiding okay.  No prostate meds.  Little weaker in the morning    No GH      PVR    11/22   088      9/22 0.8,   GFR  93         1 kidney stone many years ago    Father with prostate cancer,   no history of urologic surgery.    Strong family history of CAD    No cardiopulmonary history.  Non-smoker.          PSA    9/22   6.9  6/22   7.3  11/21 6.0  7/20   4.2      Patient has had his PSA checked for several years has been as high as 9 in the past.  Never been seen by urologist   before            Bladder Scan interpretation 11/21/2022    Estimation of residual urine via [employer] 3000 Verathon Bladder Scan  MA/nurse performing: Ronel Nieves MA  Residual Urine: 088 ml  Indication: Elevated PSA   Position: Supine  Examination: Incremental scanning of the suprapubic area using 2.0 MHz transducer using copious amounts of acoustic gel.   Findings: An anechoic area was demonstrated which represented the bladder, with measurement of residual urine as noted. I inspected this myself. In that the residual urine was stable or insignificant, refer to plan for treatment and plan necessary at this time.     Results for orders placed or performed in visit on 11/21/22   Bladder Scan   Result Value Ref Range    Volume 88    POC Urinalysis Dipstick, Automated    Specimen: Urine   Result Value Ref Range    Color Yellow Yellow, Straw, Dark Yellow, Pamela    Clarity, UA Clear Clear    Specific Gravity  1.030 1.005 - 1.030    pH, Urine 6.0 5.0 - 8.0    Leukocytes Trace (A) Negative    Nitrite, UA Negative Negative    Protein, POC Negative Negative mg/dL    Glucose, UA Negative Negative mg/dL    Ketones, UA Negative Negative    Urobilinogen, UA 0.2 E.U./dL Normal, 0.2 E.U./dL    Bilirubin Negative Negative    Blood, UA Negative Negative    Lot Number [id number]     Expiration Date 11/2,023           Objective     Past Medical History:   Diagnosis Date   • ADHD (attention deficit hyperactivity disorder) 3/01/2021   • Colon polyp last visit   • Erectile dysfunction 01/01/2019   • Hypertension    • Infectious viral hepatitis i was about 10 years old   • Stroke (HCC)        Past Surgical History:   Procedure Laterality Date   • CARPAL TUNNEL RELEASE     • COLONOSCOPY  7 years   • COLONOSCOPY N/A 10/18/2022    Procedure: COLONOSCOPY with APC COAGULTION, AND HOT SNARE POLYPECTOMY;  Surgeon: Jb Escoto MD;  Location: [facility] ENDOSCOPY;  Service: Gastroenterology;  Laterality: N/A;  COLON POLYP, DIVERTICULOSIS, AVM   • HERNIA REPAIR     • JOINT REPLACEMENT  2018   • KNEE ARTHROPLASTY, PARTIAL REPLACEMENT Bilateral    • SHOULDER SURGERY     • VASECTOMY  1982         Current Outpatient Medications:   •  Accu-Chek Diana Plus test strip, 1 each by Other route 2 (Two) Times a Day., Disp: , Rfl:   •  amphetamine-dextroamphetamine (ADDERALL) 15 MG tablet, TAKE 1 TABLET BY MOUTH EVERY DAY IN THE AFTERNOON, Disp: , Rfl:   •  amphetamine-dextroamphetamine XR (ADDERALL XR) 15 MG 24 hr capsule, Take 15 mg by mouth Every Morning, Disp: , Rfl:   •  aspirin 325 MG tablet, Take 325 mg by mouth Daily., Disp: , Rfl:   •  cetirizine (zyrTEC) 10 MG tablet, Take 1 tablet by mouth Daily., Disp: 30 tablet, Rfl: 0  •  clotrimazole-betamethasone (Lotrisone) 1-0.05 % cream, Apply 1 application topically to the appropriate area as directed 2 (Two) Times a Day., Disp: 45 g, Rfl: 5  •  dexamethasone (DECADRON) 4 MG tablet, Take 1 tablet by mouth 2 (Two) Times a Day With Meals., Disp: 10 tablet, Rfl: 0  •  famotidine (PEPCID) 40 MG tablet, TAKE 1 TABLET BY MOUTH EVERY NIGHT, Disp: 30 tablet, Rfl: 0  •  fluconazole (Diflucan) 100 MG tablet, Take 1 tablet by mouth Daily., Disp: 7 tablet, Rfl: 1  •  lisinopril (PRINIVIL,ZESTRIL) 10 MG tablet, Take 10 mg by mouth Daily., Disp: , Rfl:   •  multivitamin with minerals tablet tablet, Take  1 tablet by mouth Daily., Disp: , Rfl:   •  nystatin (MYCOSTATIN) 086316 UNIT/GM cream, Apply 1 application topically to the appropriate area as directed 2 (Two) Times a Day., Disp: , Rfl:   •  Omega-3 Fatty Acids (fish oil) 1000 MG capsule capsule, Take  by mouth Daily With Breakfast., Disp: , Rfl:   •  rosuvastatin (Crestor) 5 MG tablet, Take 1 tablet by mouth Daily., Disp: 90 tablet, Rfl: 3  •  terbinafine (LamISIL AT) 1 % cream, Apply 1 application topically to the appropriate area as directed 2 (Two) Times a Day., Disp: 45 g, Rfl: 0    Allergies   Allergen Reactions   • Gabapentin Rash   • Pregabalin Rash        Family History   Problem Relation Age of Onset   • Heart disease Father    • Colon cancer Neg Hx        Social History     Socioeconomic History   • Marital status:    Tobacco Use   • Smoking status: Former     Packs/day: 2.00     Years: 7.00     Pack years: 14.00     Types: Cigarettes     Start date: 1973     Quit date: 1979     Years since quittin.9   • Smokeless tobacco: Never   Vaping Use   • Vaping Use: Never used   Substance and Sexual Activity   • Alcohol use: Not Currently     Alcohol/week: 6.0 standard drinks     Types: 6 Glasses of wine per week   • Drug use: Never   • Sexual activity: Not Currently     Partners: Female       Vital Signs:   There were no vitals taken for this visit.     Physical exam    Alert and orient x3  Well appearing, well developed, in no acute distress   Unlabored respirations  Nontender/nondistended    Normal circumcised phallus, bilateral descended testicles without mass.  No signs of inguinal hernia    Grossly oriented to person, place and time, judgment is intact, normal mood and affect    Results for orders placed or performed during the hospital encounter of 10/18/22   Tissue Pathology Exam    Specimen: Large Intestine, Right / Ascending Colon; Tissue   Result Value Ref Range    Case Report       Surgical Pathology Report                         " Case: [id number]                                  Authorizing Provider:  Jb Escoto MD    Collected:           10/18/2022 10:06 AM          Ordering Location:     [facility] Received:            10/18/2022 11:33 AM                                 SUITES                                                                       Pathologist:           Remberto Galindo MD                                                            Specimen:    Large Intestine, Right / Ascending Colon, ASCENDING COLON POLYPS COLD SNARE                Clinical Information       Positive colorectal cancer screening using Cologuard test      Final Diagnosis       Ascending colon polyps, biopsy:    - Fragments of sessile serrated lesion        Gross Description       1. Large Intestine, Right / Ascending Colon.  Received in formalin and labeled \" ascending colon polyps cold snare\" is a 0.7 cm aggregate of tan soft tissue fragments. The specimen is entirely submitted in one cassette. JAMESON        Microscopic Description               Assessment and Plan    Diagnoses and all orders for this visit:    1. Elevated PSA (Primary)      Records reviewed today and summarized in chart    Patient does have moderately high PSA with some labile numbers.  We discussed this today I did recommend MRI prostate to rule out underlying localized lesion which could signify prostate cancer patient agrees      Follow-up after MRI prostate.       "

## 2022-11-21 ENCOUNTER — OFFICE VISIT (OUTPATIENT)
Dept: UROLOGY | Facility: CLINIC | Age: 67
End: 2022-11-21

## 2022-11-21 VITALS — BODY MASS INDEX: 29.55 KG/M2 | HEIGHT: 70 IN | WEIGHT: 206.4 LBS | RESPIRATION RATE: 12 BRPM

## 2022-11-21 DIAGNOSIS — R97.20 ELEVATED PSA: Primary | ICD-10-CM

## 2022-11-21 LAB
BILIRUB BLD-MCNC: NEGATIVE MG/DL
CLARITY, POC: CLEAR
COLOR UR: YELLOW
EXPIRATION DATE: ABNORMAL
GLUCOSE UR STRIP-MCNC: NEGATIVE MG/DL
KETONES UR QL: NEGATIVE
LEUKOCYTE EST, POC: ABNORMAL
Lab: ABNORMAL
NITRITE UR-MCNC: NEGATIVE MG/ML
PH UR: 6 [PH] (ref 5–8)
PROT UR STRIP-MCNC: NEGATIVE MG/DL
RBC # UR STRIP: NEGATIVE /UL
SP GR UR: 1.03 (ref 1–1.03)
SPECIMEN VOL 24H UR: 88 L
UROBILINOGEN UR QL: ABNORMAL

## 2022-11-21 PROCEDURE — 99203 OFFICE O/P NEW LOW 30 MIN: CPT | Performed by: UROLOGY

## 2022-11-21 PROCEDURE — 81003 URINALYSIS AUTO W/O SCOPE: CPT | Performed by: UROLOGY

## 2022-11-21 PROCEDURE — 51798 US URINE CAPACITY MEASURE: CPT | Performed by: UROLOGY

## 2022-11-23 ENCOUNTER — TELEPHONE (OUTPATIENT)
Dept: UROLOGY | Facility: CLINIC | Age: 67
End: 2022-11-23

## 2022-11-23 NOTE — TELEPHONE ENCOUNTER
Called and spoke with patient, made him aware MRI prostate is on 12/5/22 @ 2:00 with an arrival time of 1:30 at [facility]. Patient voiced understanding and read information back to me.

## 2022-12-06 DIAGNOSIS — R97.20 ELEVATED PSA: ICD-10-CM

## 2022-12-15 ENCOUNTER — TELEPHONE (OUTPATIENT)
Dept: UROLOGY | Facility: CLINIC | Age: 67
End: 2022-12-15

## 2022-12-17 NOTE — PROGRESS NOTES
Chief Complaint: Urologic complaint    Subjective         History of Present Illness  Alec Dunlap is a 67 y.o. male       Elevated PSA      Follows up today with MRI prostate.  No changes to his medical history    Voiding okay.  No prostate meds.        PVR    11/22   088      9/22 0.8,   GFR  93         1 kidney stone many years ago    Father with prostate cancer,   no history of urologic surgery.    Strong family history of CAD    No cardiopulmonary history.  Non-smoker.          PSA      12/5/2022 MRI -97 g   -  Negative, prostatitis, thickened bladder wall.  PSAd  0.07  9/22   6.9  6/22   7.3  11/21 6.0  7/20   4.2      Patient has had his PSA checked for several years has been as high as 9 in the past.  Never been seen by urologist   before            Bladder Scan interpretation 11/21/2022    Estimation of residual urine via [employer] 3000 Verathon Bladder Scan  MA/nurse performing: Ronel Nieves MA  Residual Urine: 088 ml  Indication: Elevated PSA   Position: Supine  Examination: Incremental scanning of the suprapubic area using 2.0 MHz transducer using copious amounts of acoustic gel.   Findings: An anechoic area was demonstrated which represented the bladder, with measurement of residual urine as noted. I inspected this myself. In that the residual urine was stable or insignificant, refer to plan for treatment and plan necessary at this time.     Results for orders placed or performed in visit on 11/21/22   Bladder Scan   Result Value Ref Range    Volume 88    POC Urinalysis Dipstick, Automated    Specimen: Urine   Result Value Ref Range    Color Yellow Yellow, Straw, Dark Yellow, Pamela    Clarity, UA Clear Clear    Specific Gravity  1.030 1.005 - 1.030    pH, Urine 6.0 5.0 - 8.0    Leukocytes Trace (A) Negative    Nitrite, UA Negative Negative    Protein, POC Negative Negative mg/dL    Glucose, UA Negative Negative mg/dL    Ketones, UA Negative Negative    Urobilinogen, UA 0.2 E.U./dL Normal, 0.2 E.U./dL     Bilirubin Negative Negative    Blood, UA Negative Negative    Lot Number [id number]     Expiration Date 11/2,023          Objective     Past Medical History:   Diagnosis Date   • ADHD (attention deficit hyperactivity disorder) 3/01/2021   • Colon polyp last visit   • Erectile dysfunction 01/01/2019   • Hypertension    • Infectious viral hepatitis i was about 10 years old   • Stroke (HCC)        Past Surgical History:   Procedure Laterality Date   • CARPAL TUNNEL RELEASE     • COLONOSCOPY  7 years   • COLONOSCOPY N/A 10/18/2022    Procedure: COLONOSCOPY with APC COAGULTION, AND HOT SNARE POLYPECTOMY;  Surgeon: Jb Escoto MD;  Location: [facility] ENDOSCOPY;  Service: Gastroenterology;  Laterality: N/A;  COLON POLYP, DIVERTICULOSIS, AVM   • HERNIA REPAIR     • JOINT REPLACEMENT  2018   • KNEE ARTHROPLASTY, PARTIAL REPLACEMENT Bilateral    • SHOULDER SURGERY     • VASECTOMY  1982         Current Outpatient Medications:   •  Accu-Chek Diana Plus test strip, 1 each by Other route 2 (Two) Times a Day., Disp: , Rfl:   •  amphetamine-dextroamphetamine (ADDERALL) 15 MG tablet, TAKE 1 TABLET BY MOUTH EVERY DAY IN THE AFTERNOON, Disp: , Rfl:   •  amphetamine-dextroamphetamine XR (ADDERALL XR) 15 MG 24 hr capsule, Take 15 mg by mouth Every Morning, Disp: , Rfl:   •  aspirin 325 MG tablet, Take 325 mg by mouth Daily., Disp: , Rfl:   •  cetirizine (zyrTEC) 10 MG tablet, Take 1 tablet by mouth Daily., Disp: 30 tablet, Rfl: 0  •  clotrimazole-betamethasone (Lotrisone) 1-0.05 % cream, Apply 1 application topically to the appropriate area as directed 2 (Two) Times a Day., Disp: 45 g, Rfl: 5  •  dexamethasone (DECADRON) 4 MG tablet, Take 1 tablet by mouth 2 (Two) Times a Day With Meals., Disp: 10 tablet, Rfl: 0  •  famotidine (PEPCID) 40 MG tablet, TAKE 1 TABLET BY MOUTH EVERY NIGHT, Disp: 30 tablet, Rfl: 0  •  fluconazole (Diflucan) 100 MG tablet, Take 1 tablet by mouth Daily., Disp: 7 tablet, Rfl: 1  •  lisinopril  (PRINIVIL,ZESTRIL) 10 MG tablet, Take 10 mg by mouth Daily., Disp: , Rfl:   •  multivitamin with minerals tablet tablet, Take 1 tablet by mouth Daily., Disp: , Rfl:   •  nystatin (MYCOSTATIN) 576138 UNIT/GM cream, Apply 1 application topically to the appropriate area as directed 2 (Two) Times a Day., Disp: , Rfl:   •  Omega-3 Fatty Acids (fish oil) 1000 MG capsule capsule, Take  by mouth Daily With Breakfast., Disp: , Rfl:   •  rosuvastatin (Crestor) 5 MG tablet, Take 1 tablet by mouth Daily., Disp: 90 tablet, Rfl: 3  •  terbinafine (LamISIL AT) 1 % cream, Apply 1 application topically to the appropriate area as directed 2 (Two) Times a Day., Disp: 45 g, Rfl: 0    Allergies   Allergen Reactions   • Gabapentin Rash   • Pregabalin Rash        Family History   Problem Relation Age of Onset   • Heart disease Father    • Colon cancer Neg Hx        Social History     Socioeconomic History   • Marital status:    Tobacco Use   • Smoking status: Former     Packs/day: 2.00     Years: 7.00     Pack years: 14.00     Types: Cigarettes     Start date: 1973     Quit date: 1979     Years since quittin.9   • Smokeless tobacco: Never   Vaping Use   • Vaping Use: Never used   Substance and Sexual Activity   • Alcohol use: Not Currently     Alcohol/week: 6.0 standard drinks     Types: 6 Glasses of wine per week   • Drug use: Never   • Sexual activity: Not Currently     Partners: Female       Vital Signs:   There were no vitals taken for this visit.                   Assessment and Plan    Diagnoses and all orders for this visit:    1. Elevated PSA (Primary)        MRI negative, PSA density low.  Patient given reassurance    Can go back on regular prostate cancer screening knowing that he is going to have a PSA around 6 or 7.  Does still need yearly prostate cancer screening    Patient would like to continue to be followed through urology clinic    Follow-up with nurse practitioner in 1 year        PVR at   follow-up

## 2022-12-20 ENCOUNTER — OFFICE VISIT (OUTPATIENT)
Dept: UROLOGY | Facility: CLINIC | Age: 67
End: 2022-12-20

## 2022-12-20 VITALS — HEIGHT: 70 IN | BODY MASS INDEX: 29.49 KG/M2 | WEIGHT: 206 LBS

## 2022-12-20 DIAGNOSIS — R97.20 ELEVATED PSA: Primary | ICD-10-CM

## 2022-12-20 PROCEDURE — 51798 US URINE CAPACITY MEASURE: CPT | Performed by: UROLOGY

## 2022-12-20 PROCEDURE — 99212 OFFICE O/P EST SF 10 MIN: CPT | Performed by: UROLOGY

## 2022-12-20 RX ORDER — LISDEXAMFETAMINE DIMESYLATE 20 MG/1
20 CAPSULE ORAL DAILY
COMMUNITY
Start: 2022-11-28 | End: 2023-04-03

## 2022-12-27 PROBLEM — I63.9 STROKE (HCC): Status: ACTIVE | Noted: 2022-12-27

## 2023-01-03 DIAGNOSIS — I10 HYPERTENSION, ESSENTIAL: Primary | ICD-10-CM

## 2023-01-03 RX ORDER — LISINOPRIL 10 MG/1
10 TABLET ORAL DAILY
Qty: 30 TABLET | Refills: 3 | Status: CANCELLED | OUTPATIENT
Start: 2023-01-03

## 2023-01-16 RX ORDER — ROSUVASTATIN CALCIUM 5 MG/1
5 TABLET, COATED ORAL DAILY
Qty: 90 TABLET | Refills: 3 | Status: SHIPPED | OUTPATIENT
Start: 2023-01-16

## 2023-01-16 RX ORDER — LISINOPRIL 10 MG/1
10 TABLET ORAL DAILY
Qty: 90 TABLET | Refills: 1 | Status: SHIPPED | OUTPATIENT
Start: 2023-01-16

## 2023-01-16 NOTE — TELEPHONE ENCOUNTER
Caller: Alec Dunlap    Relationship: Self    Best call back number: [phone]    Requested Prescriptions:   Requested Prescriptions     Pending Prescriptions Disp Refills   • rosuvastatin (Crestor) 5 MG tablet 90 tablet 3     Sig: Take 1 tablet by mouth Daily.        Pharmacy where request should be sent: [facility] - [address] [phone] [facility] [phone] FX     Does the patient have less than a 3 day supply:  [] Yes  [x] No    Would you like a call back once the refill request has been completed: [] Yes [x] No    If the office needs to give you a call back, can they leave a voicemail: [] Yes [x] No    Natasha Hodgson Rep   01/16/23 11:25 EST

## 2023-03-03 RX ORDER — FAMOTIDINE 40 MG/1
40 TABLET, FILM COATED ORAL NIGHTLY
Qty: 30 TABLET | Refills: 0 | Status: SHIPPED | OUTPATIENT
Start: 2023-03-03

## 2023-03-03 NOTE — TELEPHONE ENCOUNTER
Caller: ALEXYS    Relationship: [facility]    Best call back number: [phone]    Requested Prescriptions:   Requested Prescriptions     Pending Prescriptions Disp Refills   • famotidine (PEPCID) 40 MG tablet 30 tablet 0     Sig: Take 1 tablet by mouth Every Night.        Pharmacy where request should be sent: [facility] - [address] [phone] [place] [phone] FX     Does the patient have less than a 3 day supply:  [x] Yes  [] No    PATIENT IS REQUESTING A 90 DAY SUPPLY WITH 3 REFILLS    Natasha Nieto Rep   03/03/23 10:57 EST

## 2023-03-28 ENCOUNTER — LAB (OUTPATIENT)
Dept: LAB | Facility: HOSPITAL | Age: 68
End: 2023-03-28
Payer: COMMERCIAL

## 2023-03-28 DIAGNOSIS — R73.01 IFG (IMPAIRED FASTING GLUCOSE): ICD-10-CM

## 2023-03-28 DIAGNOSIS — F90.2 ATTENTION DEFICIT HYPERACTIVITY DISORDER (ADHD), COMBINED TYPE: ICD-10-CM

## 2023-03-28 DIAGNOSIS — Z12.5 SCREENING PSA (PROSTATE SPECIFIC ANTIGEN): ICD-10-CM

## 2023-03-28 DIAGNOSIS — I10 HYPERTENSION, ESSENTIAL: ICD-10-CM

## 2023-03-28 DIAGNOSIS — R19.5 POSITIVE COLORECTAL CANCER SCREENING USING COLOGUARD TEST: ICD-10-CM

## 2023-03-28 DIAGNOSIS — G45.9 TIA (TRANSIENT ISCHEMIC ATTACK): ICD-10-CM

## 2023-03-28 LAB
ALBUMIN SERPL-MCNC: 4.7 G/DL (ref 3.5–5.2)
ALBUMIN/GLOB SERPL: 1.8 G/DL
ALP SERPL-CCNC: 61 U/L (ref 39–117)
ALT SERPL W P-5'-P-CCNC: 25 U/L (ref 1–41)
ANION GAP SERPL CALCULATED.3IONS-SCNC: 8.5 MMOL/L (ref 5–15)
AST SERPL-CCNC: 22 U/L (ref 1–40)
BASOPHILS # BLD AUTO: 0.06 10*3/MM3 (ref 0–0.2)
BASOPHILS NFR BLD AUTO: 0.8 % (ref 0–1.5)
BILIRUB SERPL-MCNC: 1 MG/DL (ref 0–1.2)
BUN SERPL-MCNC: 16 MG/DL (ref 8–23)
BUN/CREAT SERPL: 18.6 (ref 7–25)
CALCIUM SPEC-SCNC: 9.5 MG/DL (ref 8.6–10.5)
CHLORIDE SERPL-SCNC: 102 MMOL/L (ref 98–107)
CHOLEST SERPL-MCNC: 161 MG/DL (ref 0–200)
CO2 SERPL-SCNC: 26.5 MMOL/L (ref 22–29)
CREAT SERPL-MCNC: 0.86 MG/DL (ref 0.76–1.27)
DEPRECATED RDW RBC AUTO: 43.3 FL (ref 37–54)
EGFRCR SERPLBLD CKD-EPI 2021: 94.9 ML/MIN/1.73
EOSINOPHIL # BLD AUTO: 0.56 10*3/MM3 (ref 0–0.4)
EOSINOPHIL NFR BLD AUTO: 7.7 % (ref 0.3–6.2)
ERYTHROCYTE [DISTWIDTH] IN BLOOD BY AUTOMATED COUNT: 13.2 % (ref 12.3–15.4)
FOLATE SERPL-MCNC: >20 NG/ML (ref 4.78–24.2)
GLOBULIN UR ELPH-MCNC: 2.6 GM/DL
GLUCOSE SERPL-MCNC: 105 MG/DL (ref 65–99)
HCT VFR BLD AUTO: 43.3 % (ref 37.5–51)
HDLC SERPL-MCNC: 69 MG/DL (ref 40–60)
HGB BLD-MCNC: 14.1 G/DL (ref 13–17.7)
IMM GRANULOCYTES # BLD AUTO: 0.04 10*3/MM3 (ref 0–0.05)
IMM GRANULOCYTES NFR BLD AUTO: 0.5 % (ref 0–0.5)
LDLC SERPL CALC-MCNC: 78 MG/DL (ref 0–100)
LDLC/HDLC SERPL: 1.13 {RATIO}
LYMPHOCYTES # BLD AUTO: 2.32 10*3/MM3 (ref 0.7–3.1)
LYMPHOCYTES NFR BLD AUTO: 31.9 % (ref 19.6–45.3)
MCH RBC QN AUTO: 29.3 PG (ref 26.6–33)
MCHC RBC AUTO-ENTMCNC: 32.6 G/DL (ref 31.5–35.7)
MCV RBC AUTO: 90 FL (ref 79–97)
MONOCYTES # BLD AUTO: 0.85 10*3/MM3 (ref 0.1–0.9)
MONOCYTES NFR BLD AUTO: 11.7 % (ref 5–12)
NEUTROPHILS NFR BLD AUTO: 3.45 10*3/MM3 (ref 1.7–7)
NEUTROPHILS NFR BLD AUTO: 47.4 % (ref 42.7–76)
NRBC BLD AUTO-RTO: 0 /100 WBC (ref 0–0.2)
PLATELET # BLD AUTO: 249 10*3/MM3 (ref 140–450)
PMV BLD AUTO: 11.6 FL (ref 6–12)
POTASSIUM SERPL-SCNC: 4.3 MMOL/L (ref 3.5–5.2)
PROT SERPL-MCNC: 7.3 G/DL (ref 6–8.5)
RBC # BLD AUTO: 4.81 10*6/MM3 (ref 4.14–5.8)
SODIUM SERPL-SCNC: 137 MMOL/L (ref 136–145)
TRIGL SERPL-MCNC: 70 MG/DL (ref 0–150)
VLDLC SERPL-MCNC: 14 MG/DL (ref 5–40)
WBC NRBC COR # BLD: 7.28 10*3/MM3 (ref 3.4–10.8)

## 2023-03-28 PROCEDURE — 80061 LIPID PANEL: CPT

## 2023-03-28 PROCEDURE — 82746 ASSAY OF FOLIC ACID SERUM: CPT

## 2023-03-28 PROCEDURE — 85025 COMPLETE CBC W/AUTO DIFF WBC: CPT

## 2023-03-28 PROCEDURE — 36415 COLL VENOUS BLD VENIPUNCTURE: CPT

## 2023-03-28 PROCEDURE — 80053 COMPREHEN METABOLIC PANEL: CPT

## 2023-04-03 ENCOUNTER — OFFICE VISIT (OUTPATIENT)
Dept: INTERNAL MEDICINE | Facility: CLINIC | Age: 68
End: 2023-04-03
Payer: COMMERCIAL

## 2023-04-03 ENCOUNTER — TELEPHONE (OUTPATIENT)
Dept: INTERNAL MEDICINE | Facility: CLINIC | Age: 68
End: 2023-04-03

## 2023-04-03 VITALS
WEIGHT: 205 LBS | BODY MASS INDEX: 29.35 KG/M2 | OXYGEN SATURATION: 97 % | DIASTOLIC BLOOD PRESSURE: 84 MMHG | TEMPERATURE: 98.7 F | HEIGHT: 70 IN | SYSTOLIC BLOOD PRESSURE: 129 MMHG | HEART RATE: 80 BPM

## 2023-04-03 DIAGNOSIS — R97.20 ELEVATED PSA: ICD-10-CM

## 2023-04-03 DIAGNOSIS — R73.01 IFG (IMPAIRED FASTING GLUCOSE): ICD-10-CM

## 2023-04-03 DIAGNOSIS — Z00.00 MEDICARE ANNUAL WELLNESS VISIT, SUBSEQUENT: Primary | ICD-10-CM

## 2023-04-03 DIAGNOSIS — R19.5 POSITIVE COLORECTAL CANCER SCREENING USING COLOGUARD TEST: ICD-10-CM

## 2023-04-03 DIAGNOSIS — G45.9 TIA (TRANSIENT ISCHEMIC ATTACK): ICD-10-CM

## 2023-04-03 DIAGNOSIS — Z12.5 SCREENING PSA (PROSTATE SPECIFIC ANTIGEN): ICD-10-CM

## 2023-04-03 DIAGNOSIS — I10 HYPERTENSION, ESSENTIAL: ICD-10-CM

## 2023-04-03 DIAGNOSIS — F90.2 ATTENTION DEFICIT HYPERACTIVITY DISORDER (ADHD), COMBINED TYPE: Primary | ICD-10-CM

## 2023-04-03 DIAGNOSIS — F90.2 ATTENTION DEFICIT HYPERACTIVITY DISORDER (ADHD), COMBINED TYPE: ICD-10-CM

## 2023-04-03 RX ORDER — DEXTROAMPHETAMINE SACCHARATE, AMPHETAMINE ASPARTATE, DEXTROAMPHETAMINE SULFATE AND AMPHETAMINE SULFATE 3.75; 3.75; 3.75; 3.75 MG/1; MG/1; MG/1; MG/1
15 TABLET ORAL DAILY
Qty: 30 TABLET | Refills: 0 | Status: SHIPPED | OUTPATIENT
Start: 2023-04-03 | End: 2023-05-03

## 2023-04-03 RX ORDER — DEXTROAMPHETAMINE SACCHARATE, AMPHETAMINE ASPARTATE, DEXTROAMPHETAMINE SULFATE AND AMPHETAMINE SULFATE 3.75; 3.75; 3.75; 3.75 MG/1; MG/1; MG/1; MG/1
15 TABLET ORAL DAILY
Qty: 30 TABLET | Refills: 0 | Status: SHIPPED | OUTPATIENT
Start: 2023-04-03

## 2023-04-03 RX ORDER — PRENATAL VIT 91/IRON/FOLIC/DHA 28-975-200
1 COMBINATION PACKAGE (EA) ORAL 2 TIMES DAILY
Qty: 45 G | Refills: 5 | Status: SHIPPED | OUTPATIENT
Start: 2023-04-03

## 2023-04-03 RX ORDER — DEXTROAMPHETAMINE SACCHARATE, AMPHETAMINE ASPARTATE, DEXTROAMPHETAMINE SULFATE AND AMPHETAMINE SULFATE 3.75; 3.75; 3.75; 3.75 MG/1; MG/1; MG/1; MG/1
15 TABLET ORAL DAILY
Qty: 30 TABLET | Refills: 0 | Status: SHIPPED | OUTPATIENT
Start: 2023-05-29 | End: 2023-06-28

## 2023-04-03 RX ORDER — LISDEXAMFETAMINE DIMESYLATE 20 MG/1
20 CAPSULE ORAL DAILY
Qty: 90 CAPSULE | Refills: 1 | Status: CANCELLED | OUTPATIENT
Start: 2023-04-03

## 2023-04-03 RX ORDER — NYSTATIN 100000 U/G
1 CREAM TOPICAL 2 TIMES DAILY
Qty: 3 G | Refills: 1 | Status: SHIPPED | OUTPATIENT
Start: 2023-04-03

## 2023-04-03 RX ORDER — DEXTROAMPHETAMINE SACCHARATE, AMPHETAMINE ASPARTATE MONOHYDRATE, DEXTROAMPHETAMINE SULFATE AND AMPHETAMINE SULFATE 3.75; 3.75; 3.75; 3.75 MG/1; MG/1; MG/1; MG/1
15 CAPSULE, EXTENDED RELEASE ORAL EVERY MORNING
Qty: 90 CAPSULE | Refills: 1 | Status: CANCELLED | OUTPATIENT
Start: 2023-04-03

## 2023-04-03 RX ORDER — DEXTROAMPHETAMINE SACCHARATE, AMPHETAMINE ASPARTATE, DEXTROAMPHETAMINE SULFATE AND AMPHETAMINE SULFATE 3.75; 3.75; 3.75; 3.75 MG/1; MG/1; MG/1; MG/1
15 TABLET ORAL DAILY
Qty: 30 TABLET | Refills: 0 | Status: SHIPPED | OUTPATIENT
Start: 2023-05-01 | End: 2023-05-31

## 2023-04-03 NOTE — PROGRESS NOTES
"Answers for HPI/ROS submitted by the patient on 4/3/2023  What is the primary reason for your visit?: Physical    CHIEF COMPLAINT/ HPI: Follow-up with his history of impaired fasting glucose TIA,, hypertension, and ADHD,  Annual Exam (Wellness exam, Lab follow up, Asking if the Vyvanse and Adderrall can be refilled/ managed here. )  Patient would like to get refills of his ADHD medicines and is switching that refill request from his psychiatry office to our office,    Discussed doing annual wellness visit also,          Objective   Vital Signs  Vitals:    04/03/23 0913   BP: 129/84   Pulse: 80   Temp: 98.7 °F (37.1 °C)   SpO2: 97%   Weight: 93 kg (205 lb)   Height: 177.8 cm (70\")      Body mass index is 29.41 kg/m².  Review of Systems   Constitutional: Negative.    HENT: Negative.    Eyes: Negative.    Respiratory: Negative.    Cardiovascular: Negative.    Gastrointestinal: Negative.    Endocrine: Negative.    Genitourinary: Negative.    Musculoskeletal: Negative.    Allergic/Immunologic: Negative.    Neurological: Negative.    Hematological: Negative.    Psychiatric/Behavioral: Negative.       Physical Exam  Constitutional:       General: He is not in acute distress.     Appearance: Normal appearance.   HENT:      Head: Normocephalic.      Mouth/Throat:      Mouth: Mucous membranes are moist.   Eyes:      Conjunctiva/sclera: Conjunctivae normal.      Pupils: Pupils are equal, round, and reactive to light.   Cardiovascular:      Rate and Rhythm: Normal rate and regular rhythm.      Pulses: Normal pulses.      Heart sounds: Normal heart sounds.   Pulmonary:      Effort: Pulmonary effort is normal.      Breath sounds: Normal breath sounds.   Abdominal:      General: Abdomen is flat. Bowel sounds are normal.      Palpations: Abdomen is soft.   Musculoskeletal:         General: No swelling. Normal range of motion.      Cervical back: Neck supple.   Skin:     General: Skin is warm and dry.      Coloration: Skin is not " jaundiced.   Neurological:      General: No focal deficit present.      Mental Status: He is alert and oriented to person, place, and time. Mental status is at baseline.   Psychiatric:         Mood and Affect: Mood normal.         Behavior: Behavior normal.         Thought Content: Thought content normal.         Judgment: Judgment normal.        Result Review :   No results found for: PROBNP, BNP  CMP    CMP 7/19/22 9/26/22 3/28/23   Glucose  102 (A) 105 (A)   BUN  14 16   Creatinine 0.80 0.89 0.86   eGFR 97.6 93.9 94.9   Sodium  139 137   Potassium  4.4 4.3   Chloride  104 102   Calcium  9.6 9.5   Total Protein  7.5 7.3   Albumin  4.60 4.7   Globulin  2.9 2.6   Total Bilirubin  0.6 1.0   Alkaline Phosphatase  65 61   AST (SGOT)  19 22   ALT (SGPT)  23 25   Albumin/Globulin Ratio  1.6 1.8   BUN/Creatinine Ratio  15.7 18.6   Anion Gap  10.3 8.5   (A) Abnormal value       Comments are available for some flowsheets but are not being displayed.           CBC w/diff    CBC w/Diff 5/17/22 9/26/22 3/28/23   WBC 8.59 8.07 7.28   RBC 4.70 4.79 4.81   Hemoglobin 14.0 14.3 14.1   Hematocrit 42.5 42.5 43.3   MCV 90.4 88.7 90.0   MCH 29.8 29.9 29.3   MCHC 32.9 33.6 32.6   RDW 13.3 12.7 13.2   Platelets 263 269 249   Neutrophil Rel % 50.9 45.3 47.4   Immature Granulocyte Rel % 0.2 0.5 0.5   Lymphocyte Rel % 30.6 36.2 31.9   Monocyte Rel % 11.6 10.5 11.7   Eosinophil Rel % 5.9 6.6 (A) 7.7 (A)   Basophil Rel % 0.8 0.9 0.8   (A) Abnormal value             Lipid Panel    Lipid Panel 9/26/22 3/28/23   Total Cholesterol 191 161   Triglycerides 67 70   HDL Cholesterol 65 (A) 69 (A)   VLDL Cholesterol 12 14   LDL Cholesterol  114 (A) 78   LDL/HDL Ratio 1.73 1.13   (A) Abnormal value             Lab Results   Component Value Date    TSH 1.350 09/26/2022    TSH 1.100 01/17/2019      Lab Results   Component Value Date    FREET4 0.97 09/26/2022    FREET4 1.1 01/17/2019      A1C Last 3 Results    HGBA1C Last 3 Results 6/2/22   Hemoglobin A1C  6.0 (A)   (A) Abnormal value       Comments are available for some flowsheets but are not being displayed.            PSA    PSA 9/26/22   PSA 6.990 (A)   (A) Abnormal value                           Visit Diagnoses:    ICD-10-CM ICD-9-CM   1. Medicare annual wellness visit, subsequent  Z00.00 V70.0   2. TIA (transient ischemic attack)  G45.9 435.9   3. Positive colorectal cancer screening using Cologuard test  R19.5 787.7   4. IFG (impaired fasting glucose)  R73.01 790.21   5. Hypertension, essential  I10 401.9   6. Attention deficit hyperactivity disorder (ADHD), combined type  F90.2 314.01   7. Elevated PSA  R97.20 790.93   8. Screening PSA (prostate specific antigen)  Z12.5 V76.44       Assessment and Plan   Diagnoses and all orders for this visit:    1. Medicare annual wellness visit, subsequent (Primary)  -     amphetamine-dextroamphetamine (ADDERALL) 15 MG tablet; Take 1 tablet by mouth Daily.  Dispense: 30 tablet; Refill: 0  -     lisdexamfetamine (Vyvanse) 40 MG capsule; Take 1 capsule by mouth Every Morning  Dispense: 30 capsule; Refill: 0  -     Comprehensive Metabolic Panel; Future  -     CBC & Differential; Future  -     Hemoglobin A1c; Future  -     Lipid Panel; Future  -     PSA Screen; Future    2. TIA (transient ischemic attack)  -     amphetamine-dextroamphetamine (ADDERALL) 15 MG tablet; Take 1 tablet by mouth Daily.  Dispense: 30 tablet; Refill: 0  -     lisdexamfetamine (Vyvanse) 40 MG capsule; Take 1 capsule by mouth Every Morning  Dispense: 30 capsule; Refill: 0  -     Comprehensive Metabolic Panel; Future  -     CBC & Differential; Future  -     Hemoglobin A1c; Future  -     Lipid Panel; Future  -     PSA Screen; Future    3. Positive colorectal cancer screening using Cologuard test  -     amphetamine-dextroamphetamine (ADDERALL) 15 MG tablet; Take 1 tablet by mouth Daily.  Dispense: 30 tablet; Refill: 0  -     lisdexamfetamine (Vyvanse) 40 MG capsule; Take 1 capsule by mouth Every Morning   Dispense: 30 capsule; Refill: 0  -     Comprehensive Metabolic Panel; Future  -     CBC & Differential; Future  -     Hemoglobin A1c; Future  -     Lipid Panel; Future  -     PSA Screen; Future    4. IFG (impaired fasting glucose)  -     amphetamine-dextroamphetamine (ADDERALL) 15 MG tablet; Take 1 tablet by mouth Daily.  Dispense: 30 tablet; Refill: 0  -     lisdexamfetamine (Vyvanse) 40 MG capsule; Take 1 capsule by mouth Every Morning  Dispense: 30 capsule; Refill: 0  -     Comprehensive Metabolic Panel; Future  -     CBC & Differential; Future  -     Hemoglobin A1c; Future  -     Lipid Panel; Future  -     PSA Screen; Future    5. Hypertension, essential  -     amphetamine-dextroamphetamine (ADDERALL) 15 MG tablet; Take 1 tablet by mouth Daily.  Dispense: 30 tablet; Refill: 0  -     lisdexamfetamine (Vyvanse) 40 MG capsule; Take 1 capsule by mouth Every Morning  Dispense: 30 capsule; Refill: 0  -     Comprehensive Metabolic Panel; Future  -     CBC & Differential; Future  -     Hemoglobin A1c; Future  -     Lipid Panel; Future  -     PSA Screen; Future    6. Attention deficit hyperactivity disorder (ADHD), combined type  -     amphetamine-dextroamphetamine (ADDERALL) 15 MG tablet; Take 1 tablet by mouth Daily.  Dispense: 30 tablet; Refill: 0  -     lisdexamfetamine (Vyvanse) 40 MG capsule; Take 1 capsule by mouth Every Morning  Dispense: 30 capsule; Refill: 0  -     Comprehensive Metabolic Panel; Future  -     CBC & Differential; Future  -     Hemoglobin A1c; Future  -     Lipid Panel; Future  -     PSA Screen; Future    7. Elevated PSA  -     amphetamine-dextroamphetamine (ADDERALL) 15 MG tablet; Take 1 tablet by mouth Daily.  Dispense: 30 tablet; Refill: 0  -     lisdexamfetamine (Vyvanse) 40 MG capsule; Take 1 capsule by mouth Every Morning  Dispense: 30 capsule; Refill: 0  -     Comprehensive Metabolic Panel; Future  -     CBC & Differential; Future  -     Hemoglobin A1c; Future  -     Lipid Panel; Future  -      PSA Screen; Future    8. Screening PSA (prostate specific antigen)  -     amphetamine-dextroamphetamine (ADDERALL) 15 MG tablet; Take 1 tablet by mouth Daily.  Dispense: 30 tablet; Refill: 0  -     lisdexamfetamine (Vyvanse) 40 MG capsule; Take 1 capsule by mouth Every Morning  Dispense: 30 capsule; Refill: 0  -     Comprehensive Metabolic Panel; Future  -     CBC & Differential; Future  -     Hemoglobin A1c; Future  -     Lipid Panel; Future  -     PSA Screen; Future    Other orders  -     terbinafine (LamISIL AT) 1 % cream; Apply 1 application topically to the appropriate area as directed 2 (Two) Times a Day.  Dispense: 45 g; Refill: 5  -     nystatin (MYCOSTATIN) 357476 UNIT/GM cream; Apply 1 application topically to the appropriate area as directed 2 (Two) Times a Day.  Dispense: 3 g; Refill: 1        BMI is >= 25 and <30. (Overweight) The following options were offered after discussion;: weight loss educational material (shared in after visit summary)     Medicare wellness visit will be completed on a separate intake, April 3, 2023    IFG -- never could get into endo, overall better     Hypertension continues lisinopril 10 mg daily,    TIA, approximately 2018,--- takes an aspirin daily--continues Crestor 5 mg daily since October 10, 2022, along with co-Q10    Elevated PSA==6.9 oct 2022, --- was referred to Dr. Valadez, MRI of the prostate showed enlarged prostate with some evidence of acute and chronic prostatitis December 6, 2022,    ADHD, continues Vyvanse 40 mg a.m., and Adderall  15 mg in the afternoon,    Bilateral partial knee replacements      Follow Up   Return in about 6 months (around 10/3/2023).  Patient was given instructions and counseling regarding his condition or for health maintenance advice. Please see specific information pulled into the AVS if appropriate.

## 2023-04-03 NOTE — PROGRESS NOTES
The ABCs of the Annual Wellness Visit  Subsequent Medicare Wellness Visit    Subjective      Alec Dunlap is a 67 y.o. male who presents for a Subsequent Medicare Wellness Visit.    The following portions of the patient's history were reviewed and   updated as appropriate: allergies, current medications, past family history, past medical history, past social history, past surgical history and problem list.    Compared to one year ago, the patient feels his physical   health is better .    Compared to one year ago, the patient feels his mental   health is better     Recent Hospitalizations:  He was not admitted to the hospital during the last year.       Current Medical Providers:  Patient Care Team:  Scott Sewell MD as PCP - General (Internal Medicine)  Gio Valadez MD as Consulting Physician (Urology)    Outpatient Medications Prior to Visit   Medication Sig Dispense Refill   • Accu-Chek Diana Plus test strip 1 each by Other route 2 (Two) Times a Day.     • amoxicillin (AMOXIL) 875 MG tablet Take 1 tablet by mouth 2 (Two) Times a Day. 20 tablet 0   • amphetamine-dextroamphetamine XR (ADDERALL XR) 15 MG 24 hr capsule Take 15 mg by mouth Every Morning     • aspirin 325 MG tablet Take 325 mg by mouth Daily.     • cetirizine (zyrTEC) 10 MG tablet Take 1 tablet by mouth Daily. 30 tablet 0   • clotrimazole-betamethasone (Lotrisone) 1-0.05 % cream Apply 1 application topically to the appropriate area as directed 2 (Two) Times a Day. 45 g 5   • dexamethasone (DECADRON) 4 MG tablet Take 1 tablet by mouth 2 (Two) Times a Day With Meals. 10 tablet 0   • famotidine (PEPCID) 40 MG tablet Take 1 tablet by mouth Every Night. 30 tablet 0   • lisinopril (PRINIVIL,ZESTRIL) 10 MG tablet Take 1 tablet by mouth Daily. 90 tablet 1   • multivitamin with minerals tablet tablet Take 1 tablet by mouth Daily.     • nystatin (MYCOSTATIN) 195277 UNIT/GM cream Apply 1 application topically to the appropriate area as directed  "2 (Two) Times a Day.     • Omega-3 Fatty Acids (fish oil) 1000 MG capsule capsule Take  by mouth Daily With Breakfast.     • rosuvastatin (Crestor) 5 MG tablet Take 1 tablet by mouth Daily. 90 tablet 3   • terbinafine (LamISIL AT) 1 % cream Apply 1 application topically to the appropriate area as directed 2 (Two) Times a Day. 45 g 0   • Vyvanse 20 MG capsule Take 20 mg by mouth Daily       No facility-administered medications prior to visit.       No opioid medication identified on active medication list. I have reviewed chart for other potential  high risk medication/s and harmful drug interactions in the elderly.          Aspirin is on active medication list. Aspirin use is indicated based on review of current medical condition/s. Pros and cons of this therapy have been discussed today. Benefits of this medication outweigh potential harm.  Patient has been encouraged to continue taking this medication.  .      Patient Active Problem List   Diagnosis   • Positive colorectal cancer screening using Cologuard test   • Attention deficit hyperactivity disorder (ADHD), combined type   • Hypertension, essential   • TIA (transient ischemic attack)   • Medicare annual wellness visit, subsequent   • IFG (impaired fasting glucose)   • Elevated PSA   • Stroke     Advance Care Planning  Patient has an advanced directive and living will on file,     Objective    There were no vitals filed for this visit.  Estimated body mass index is 29.56 kg/m² as calculated from the following:    Height as of 12/20/22: 177.8 cm (70\").    Weight as of 12/27/22: 93.4 kg (206 lb).    BMI is >= 25 and <30. (Overweight) The following options were offered after discussion;: exercise counseling/recommendations      Does the patient have evidence of cognitive impairment?   No    Lab Results   Component Value Date    TRIG 70 03/28/2023    HDL 69 (H) 03/28/2023    LDL 78 03/28/2023    VLDL 14 03/28/2023          HEALTH RISK ASSESSMENT    Smoking " Status:  Social History     Tobacco Use   Smoking Status Former   • Packs/day: 2.00   • Years: 7.00   • Pack years: 14.00   • Types: Cigarettes   • Start date: 1973   • Quit date: 1979   • Years since quittin.2   • Passive exposure: Never   Smokeless Tobacco Never     Alcohol Consumption:  Social History     Substance and Sexual Activity   Alcohol Use Not Currently   • Alcohol/week: 6.0 standard drinks   • Types: 6 Glasses of wine per week     Fall Risk Screen:    TAMMYADI Fall Risk Assessment has not been completed.    Depression Screening:  PHQ-2/PHQ-9 Depression Screening 2022   Little Interest or Pleasure in Doing Things 0-->not at all   Feeling Down, Depressed or Hopeless 0-->not at all   PHQ-9: Brief Depression Severity Measure Score 0       Health Habits and Functional and Cognitive Screening:  No flowsheet data found.    Age-appropriate Screening Schedule:  Refer to the list below for future screening recommendations based on patient's age, sex and/or medical conditions. Orders for these recommended tests are listed in the plan section. The patient has been provided with a written plan.    Health Maintenance   Topic Date Due   • URINE MICROALBUMIN  Never done   • ZOSTER VACCINE (3 of 3) 2021   • COVID-19 Vaccine (3 - Booster for Pfizer series) 2021   • ANNUAL PHYSICAL  Never done   • DIABETIC FOOT EXAM  Never done   • DIABETIC EYE EXAM  Never done   • Pneumococcal Vaccine 65+ (2 - PPSV23 if available, else PCV20) 2021   • AAA SCREEN (ONE-TIME)  Never done   • HEMOGLOBIN A1C  2022   • INFLUENZA VACCINE  2023   • COLORECTAL CANCER SCREENING  10/18/2027   • TDAP/TD VACCINES (2 - Td or Tdap) 2030   • HEPATITIS C SCREENING  Completed                CMS Preventative Services Quick Reference  Risk Factors Identified During Encounter:    None Identified    The above risks/problems have been discussed with the patient.  Pertinent information has been shared with the  patient in the After Visit Summary.    Diagnoses and all orders for this visit:    1. Medicare annual wellness visit, subsequent (Primary)    2. TIA (transient ischemic attack)    3. Positive colorectal cancer screening using Cologuard test    4. IFG (impaired fasting glucose)    5. Hypertension, essential    6. Attention deficit hyperactivity disorder (ADHD), combined type    7. Elevated PSA        Follow Up:   Next Medicare Wellness visit to be scheduled in 1 year.      An After Visit Summary and PPPS were made available to the patient.          Answers for HPI/ROS submitted by the patient on 4/3/2023  What is the primary reason for your visit?: Physical

## 2023-04-03 NOTE — TELEPHONE ENCOUNTER
PATIENT CALLED AFTER HIS VISIT WITH DR. BERNAL THIS MORNING. HE WAS SENT [facility] FOR ADDERRAL AND VYVANCE BUT [facility] SAID THEY NEED TO BE 90 DAYS.  PATIENT IS ASKING FOR THIS TO BE CORRECTED TO 90 DAYS ON BOTH RX'S.     [facility] DESK

## 2023-04-04 ENCOUNTER — TELEPHONE (OUTPATIENT)
Dept: INTERNAL MEDICINE | Facility: CLINIC | Age: 68
End: 2023-04-04

## 2023-04-04 NOTE — TELEPHONE ENCOUNTER
You will need to clarify this with his pharmacy??, his diagnosis is ADHD, check with Jess the  and Ayla and all 3 of you figure out what needs to be done to get this approved

## 2023-04-04 NOTE — TELEPHONE ENCOUNTER
Pharmacy Name:  [facility]    Pharmacy representative name: GRAZYNA    Pharmacy representative phone number: [phone]  REFERENCE NUMBER: [id number]    What medication are you calling in regards to: VYVANCE 40 MG   What question does the pharmacy have: GRAZYNA STATED THAT THE DIAGNOSIS OR DIAGNOSIS CODE, AND THAT THE ONE PROVIDED DOSE NOT MATCH FOR THIS MEDICATION.     Who is the provider that prescribed the medication: KERRY BERNAL MD

## 2023-04-04 NOTE — TELEPHONE ENCOUNTER
Spoke with pharmacy and give correct DX, medication was sent previously with a TIA DX and they needed clarification on that.

## 2023-05-11 ENCOUNTER — TELEPHONE (OUTPATIENT)
Dept: INTERNAL MEDICINE | Facility: CLINIC | Age: 68
End: 2023-05-11
Payer: COMMERCIAL

## 2023-05-11 DIAGNOSIS — R97.20 ELEVATED PSA: ICD-10-CM

## 2023-05-11 DIAGNOSIS — F90.2 ATTENTION DEFICIT HYPERACTIVITY DISORDER (ADHD), COMBINED TYPE: ICD-10-CM

## 2023-05-11 DIAGNOSIS — I10 HYPERTENSION, ESSENTIAL: ICD-10-CM

## 2023-05-11 DIAGNOSIS — Z12.5 SCREENING PSA (PROSTATE SPECIFIC ANTIGEN): ICD-10-CM

## 2023-05-11 DIAGNOSIS — R19.5 POSITIVE COLORECTAL CANCER SCREENING USING COLOGUARD TEST: ICD-10-CM

## 2023-05-11 DIAGNOSIS — R73.01 IFG (IMPAIRED FASTING GLUCOSE): ICD-10-CM

## 2023-05-11 DIAGNOSIS — Z00.00 MEDICARE ANNUAL WELLNESS VISIT, SUBSEQUENT: ICD-10-CM

## 2023-05-11 DIAGNOSIS — G45.9 TIA (TRANSIENT ISCHEMIC ATTACK): ICD-10-CM

## 2023-05-11 RX ORDER — DEXTROAMPHETAMINE SACCHARATE, AMPHETAMINE ASPARTATE, DEXTROAMPHETAMINE SULFATE AND AMPHETAMINE SULFATE 3.75; 3.75; 3.75; 3.75 MG/1; MG/1; MG/1; MG/1
15 TABLET ORAL DAILY
Qty: 30 TABLET | Refills: 0 | Status: SHIPPED | OUTPATIENT
Start: 2023-05-11

## 2023-05-11 NOTE — TELEPHONE ENCOUNTER
Pt is needing Vyvanse and Adderral , is it ok to send both ? Pt needs it sent to [facility], Please advise

## 2023-05-11 NOTE — TELEPHONE ENCOUNTER
Yes, they can both be refilled for 1 month only,, I went ahead and filled both of those already and sent them in just let the patient know

## 2023-06-12 DIAGNOSIS — F90.2 ATTENTION DEFICIT HYPERACTIVITY DISORDER (ADHD), COMBINED TYPE: ICD-10-CM

## 2023-06-12 RX ORDER — DEXTROAMPHETAMINE SACCHARATE, AMPHETAMINE ASPARTATE, DEXTROAMPHETAMINE SULFATE AND AMPHETAMINE SULFATE 3.75; 3.75; 3.75; 3.75 MG/1; MG/1; MG/1; MG/1
15 TABLET ORAL DAILY
Qty: 30 TABLET | Refills: 0 | Status: SHIPPED | OUTPATIENT
Start: 2023-06-12 | End: 2023-06-14 | Stop reason: SDUPTHER

## 2023-06-12 NOTE — TELEPHONE ENCOUNTER
PATIENT CALLED ASKING FOR REFILLS ON HIS:  1) [facility]   2) [facility]      WANTS THESE TO GO TO [facility] NOW (NOT [facility]).        PT # [phone]

## 2023-06-14 DIAGNOSIS — F90.2 ATTENTION DEFICIT HYPERACTIVITY DISORDER (ADHD), COMBINED TYPE: ICD-10-CM

## 2023-06-14 RX ORDER — DEXTROAMPHETAMINE SACCHARATE, AMPHETAMINE ASPARTATE, DEXTROAMPHETAMINE SULFATE AND AMPHETAMINE SULFATE 3.75; 3.75; 3.75; 3.75 MG/1; MG/1; MG/1; MG/1
15 TABLET ORAL DAILY
Qty: 30 TABLET | Refills: 0 | Status: SHIPPED | OUTPATIENT
Start: 2023-06-14 | End: 2023-07-14

## 2023-06-14 NOTE — TELEPHONE ENCOUNTER
Patient called and stated his Rx's for Vyvanse and Adderall were supposed to have been sent to [facility] and not [facility]. I called and cancelled the [facility] at [facility] and repended for [facility].

## 2023-08-21 DIAGNOSIS — F90.2 ATTENTION DEFICIT HYPERACTIVITY DISORDER (ADHD), COMBINED TYPE: ICD-10-CM

## 2023-08-21 RX ORDER — DEXTROAMPHETAMINE SACCHARATE, AMPHETAMINE ASPARTATE, DEXTROAMPHETAMINE SULFATE AND AMPHETAMINE SULFATE 3.75; 3.75; 3.75; 3.75 MG/1; MG/1; MG/1; MG/1
15 TABLET ORAL DAILY
Qty: 30 TABLET | Refills: 0 | Status: SHIPPED | OUTPATIENT
Start: 2023-08-21 | End: 2023-09-20

## 2023-09-20 DIAGNOSIS — F90.2 ATTENTION DEFICIT HYPERACTIVITY DISORDER (ADHD), COMBINED TYPE: ICD-10-CM

## 2023-09-20 RX ORDER — DEXTROAMPHETAMINE SACCHARATE, AMPHETAMINE ASPARTATE, DEXTROAMPHETAMINE SULFATE AND AMPHETAMINE SULFATE 3.75; 3.75; 3.75; 3.75 MG/1; MG/1; MG/1; MG/1
15 TABLET ORAL DAILY
Qty: 30 TABLET | Refills: 0 | Status: SHIPPED | OUTPATIENT
Start: 2023-09-20 | End: 2023-10-20

## 2023-10-09 ENCOUNTER — LAB (OUTPATIENT)
Dept: LAB | Facility: HOSPITAL | Age: 68
End: 2023-10-09
Payer: COMMERCIAL

## 2023-10-09 DIAGNOSIS — G45.9 TIA (TRANSIENT ISCHEMIC ATTACK): ICD-10-CM

## 2023-10-09 DIAGNOSIS — I10 HYPERTENSION, ESSENTIAL: ICD-10-CM

## 2023-10-09 DIAGNOSIS — R97.20 ELEVATED PSA: ICD-10-CM

## 2023-10-09 DIAGNOSIS — R73.01 IFG (IMPAIRED FASTING GLUCOSE): ICD-10-CM

## 2023-10-09 DIAGNOSIS — Z12.5 SCREENING PSA (PROSTATE SPECIFIC ANTIGEN): ICD-10-CM

## 2023-10-09 DIAGNOSIS — F90.2 ATTENTION DEFICIT HYPERACTIVITY DISORDER (ADHD), COMBINED TYPE: ICD-10-CM

## 2023-10-09 DIAGNOSIS — R19.5 POSITIVE COLORECTAL CANCER SCREENING USING COLOGUARD TEST: ICD-10-CM

## 2023-10-09 DIAGNOSIS — Z00.00 MEDICARE ANNUAL WELLNESS VISIT, SUBSEQUENT: ICD-10-CM

## 2023-10-09 LAB
ALBUMIN SERPL-MCNC: 4.5 G/DL (ref 3.5–5.2)
ALBUMIN/GLOB SERPL: 1.7 G/DL
ALP SERPL-CCNC: 75 U/L (ref 39–117)
ALT SERPL W P-5'-P-CCNC: 21 U/L (ref 1–41)
ANION GAP SERPL CALCULATED.3IONS-SCNC: 12.5 MMOL/L (ref 5–15)
AST SERPL-CCNC: 20 U/L (ref 1–40)
BASOPHILS # BLD AUTO: 0.07 10*3/MM3 (ref 0–0.2)
BASOPHILS NFR BLD AUTO: 0.8 % (ref 0–1.5)
BILIRUB SERPL-MCNC: 0.7 MG/DL (ref 0–1.2)
BUN SERPL-MCNC: 15 MG/DL (ref 8–23)
BUN/CREAT SERPL: 15.8 (ref 7–25)
CALCIUM SPEC-SCNC: 9.6 MG/DL (ref 8.6–10.5)
CHLORIDE SERPL-SCNC: 103 MMOL/L (ref 98–107)
CHOLEST SERPL-MCNC: 154 MG/DL (ref 0–200)
CO2 SERPL-SCNC: 24.5 MMOL/L (ref 22–29)
CREAT SERPL-MCNC: 0.95 MG/DL (ref 0.76–1.27)
DEPRECATED RDW RBC AUTO: 42.6 FL (ref 37–54)
EGFRCR SERPLBLD CKD-EPI 2021: 87.2 ML/MIN/1.73
EOSINOPHIL # BLD AUTO: 0.6 10*3/MM3 (ref 0–0.4)
EOSINOPHIL NFR BLD AUTO: 6.8 % (ref 0.3–6.2)
ERYTHROCYTE [DISTWIDTH] IN BLOOD BY AUTOMATED COUNT: 13 % (ref 12.3–15.4)
GLOBULIN UR ELPH-MCNC: 2.7 GM/DL
GLUCOSE SERPL-MCNC: 93 MG/DL (ref 65–99)
HBA1C MFR BLD: 5.9 % (ref 4.8–5.6)
HCT VFR BLD AUTO: 42.4 % (ref 37.5–51)
HDLC SERPL-MCNC: 67 MG/DL (ref 40–60)
HGB BLD-MCNC: 14.1 G/DL (ref 13–17.7)
IMM GRANULOCYTES # BLD AUTO: 0.06 10*3/MM3 (ref 0–0.05)
IMM GRANULOCYTES NFR BLD AUTO: 0.7 % (ref 0–0.5)
LDLC SERPL CALC-MCNC: 77 MG/DL (ref 0–100)
LDLC/HDLC SERPL: 1.16 {RATIO}
LYMPHOCYTES # BLD AUTO: 2.22 10*3/MM3 (ref 0.7–3.1)
LYMPHOCYTES NFR BLD AUTO: 25.3 % (ref 19.6–45.3)
MCH RBC QN AUTO: 30.1 PG (ref 26.6–33)
MCHC RBC AUTO-ENTMCNC: 33.3 G/DL (ref 31.5–35.7)
MCV RBC AUTO: 90.6 FL (ref 79–97)
MONOCYTES # BLD AUTO: 0.81 10*3/MM3 (ref 0.1–0.9)
MONOCYTES NFR BLD AUTO: 9.2 % (ref 5–12)
NEUTROPHILS NFR BLD AUTO: 5.02 10*3/MM3 (ref 1.7–7)
NEUTROPHILS NFR BLD AUTO: 57.2 % (ref 42.7–76)
NRBC BLD AUTO-RTO: 0 /100 WBC (ref 0–0.2)
PLATELET # BLD AUTO: 276 10*3/MM3 (ref 140–450)
PMV BLD AUTO: 11.3 FL (ref 6–12)
POTASSIUM SERPL-SCNC: 4.5 MMOL/L (ref 3.5–5.2)
PROT SERPL-MCNC: 7.2 G/DL (ref 6–8.5)
PSA SERPL-MCNC: 10.7 NG/ML (ref 0–4)
RBC # BLD AUTO: 4.68 10*6/MM3 (ref 4.14–5.8)
SODIUM SERPL-SCNC: 140 MMOL/L (ref 136–145)
TRIGL SERPL-MCNC: 48 MG/DL (ref 0–150)
VLDLC SERPL-MCNC: 10 MG/DL (ref 5–40)
WBC NRBC COR # BLD: 8.78 10*3/MM3 (ref 3.4–10.8)

## 2023-10-09 PROCEDURE — 83036 HEMOGLOBIN GLYCOSYLATED A1C: CPT

## 2023-10-09 PROCEDURE — 36415 COLL VENOUS BLD VENIPUNCTURE: CPT

## 2023-10-09 PROCEDURE — 85025 COMPLETE CBC W/AUTO DIFF WBC: CPT

## 2023-10-09 PROCEDURE — G0103 PSA SCREENING: HCPCS

## 2023-10-09 PROCEDURE — 80061 LIPID PANEL: CPT

## 2023-10-09 PROCEDURE — 80053 COMPREHEN METABOLIC PANEL: CPT

## 2023-10-17 ENCOUNTER — OFFICE VISIT (OUTPATIENT)
Dept: INTERNAL MEDICINE | Facility: CLINIC | Age: 68
End: 2023-10-17
Payer: COMMERCIAL

## 2023-10-17 ENCOUNTER — TELEPHONE (OUTPATIENT)
Dept: UROLOGY | Facility: CLINIC | Age: 68
End: 2023-10-17
Payer: COMMERCIAL

## 2023-10-17 VITALS
TEMPERATURE: 97.5 F | HEIGHT: 70 IN | WEIGHT: 207 LBS | DIASTOLIC BLOOD PRESSURE: 82 MMHG | BODY MASS INDEX: 29.63 KG/M2 | OXYGEN SATURATION: 96 % | SYSTOLIC BLOOD PRESSURE: 135 MMHG | HEART RATE: 75 BPM

## 2023-10-17 DIAGNOSIS — R73.01 IFG (IMPAIRED FASTING GLUCOSE): ICD-10-CM

## 2023-10-17 DIAGNOSIS — R97.20 ELEVATED PSA: ICD-10-CM

## 2023-10-17 DIAGNOSIS — R19.5 POSITIVE COLORECTAL CANCER SCREENING USING COLOGUARD TEST: ICD-10-CM

## 2023-10-17 DIAGNOSIS — I10 HYPERTENSION, ESSENTIAL: ICD-10-CM

## 2023-10-17 DIAGNOSIS — G45.9 TIA (TRANSIENT ISCHEMIC ATTACK): Primary | ICD-10-CM

## 2023-10-17 DIAGNOSIS — Z12.5 SCREENING PSA (PROSTATE SPECIFIC ANTIGEN): ICD-10-CM

## 2023-10-17 DIAGNOSIS — R97.20 ELEVATED PROSTATE SPECIFIC ANTIGEN (PSA): Primary | ICD-10-CM

## 2023-10-17 DIAGNOSIS — F90.2 ATTENTION DEFICIT HYPERACTIVITY DISORDER (ADHD), COMBINED TYPE: ICD-10-CM

## 2023-10-17 RX ORDER — TRIAMCINOLONE ACETONIDE 1 MG/G
1 CREAM TOPICAL 2 TIMES DAILY
Qty: 45 G | Refills: 2 | Status: SHIPPED | OUTPATIENT
Start: 2023-10-17

## 2023-10-17 NOTE — PROGRESS NOTES
"CHIEF COMPLAINT/ HPI:  Hypertension (Routine follow up, Lab follow up. Asking for ointment or topical to help with both index fingers , Hx of smashed fingers/ no nail. /)              Objective   Vital Signs  Vitals:    10/17/23 0956   BP: 135/82   Pulse: 75   Temp: 97.5 °F (36.4 °C)   SpO2: 96%   Weight: 93.9 kg (207 lb)   Height: 177.8 cm (70\")      Body mass index is 29.7 kg/m².  Review of Systems   Constitutional: Negative.    HENT: Negative.     Eyes: Negative.    Respiratory: Negative.     Cardiovascular: Negative.    Gastrointestinal: Negative.    Endocrine: Negative.    Genitourinary: Negative.    Musculoskeletal: Negative.    Allergic/Immunologic: Negative.    Neurological: Negative.    Hematological: Negative.    Psychiatric/Behavioral: Negative.        Physical Exam  Constitutional:       General: He is not in acute distress.     Appearance: Normal appearance.   HENT:      Head: Normocephalic.      Mouth/Throat:      Mouth: Mucous membranes are moist.   Eyes:      Conjunctiva/sclera: Conjunctivae normal.      Pupils: Pupils are equal, round, and reactive to light.   Cardiovascular:      Rate and Rhythm: Normal rate and regular rhythm.      Pulses: Normal pulses.      Heart sounds: Normal heart sounds.   Pulmonary:      Effort: Pulmonary effort is normal.      Breath sounds: Normal breath sounds.   Abdominal:      General: Abdomen is flat. Bowel sounds are normal.      Palpations: Abdomen is soft.   Musculoskeletal:         General: No swelling. Normal range of motion.      Cervical back: Neck supple.   Skin:     General: Skin is warm and dry.      Coloration: Skin is not jaundiced.   Neurological:      General: No focal deficit present.      Mental Status: He is alert and oriented to person, place, and time. Mental status is at baseline.   Psychiatric:         Mood and Affect: Mood normal.         Behavior: Behavior normal.         Thought Content: Thought content normal.         Judgment: Judgment " "normal.        Result Review :   No results found for: \"PROBNP\", \"BNP\"  CMP          3/28/2023    10:25 10/9/2023    09:25   CMP   Glucose 105  93    BUN 16  15    Creatinine 0.86  0.95    EGFR 94.9  87.2    Sodium 137  140    Potassium 4.3  4.5    Chloride 102  103    Calcium 9.5  9.6    Total Protein 7.3  7.2    Albumin 4.7  4.5    Globulin 2.6  2.7    Total Bilirubin 1.0  0.7    Alkaline Phosphatase 61  75    AST (SGOT) 22  20    ALT (SGPT) 25  21    Albumin/Globulin Ratio 1.8  1.7    BUN/Creatinine Ratio 18.6  15.8    Anion Gap 8.5  12.5      CBC w/diff          3/28/2023    10:25 10/9/2023    09:25   CBC w/Diff   WBC 7.28  8.78    RBC 4.81  4.68    Hemoglobin 14.1  14.1    Hematocrit 43.3  42.4    MCV 90.0  90.6    MCH 29.3  30.1    MCHC 32.6  33.3    RDW 13.2  13.0    Platelets 249  276    Neutrophil Rel % 47.4  57.2    Immature Granulocyte Rel % 0.5  0.7    Lymphocyte Rel % 31.9  25.3    Monocyte Rel % 11.7  9.2    Eosinophil Rel % 7.7  6.8    Basophil Rel % 0.8  0.8       Lipid Panel          3/28/2023    10:25 10/9/2023    09:25   Lipid Panel   Total Cholesterol 161  154    Triglycerides 70  48    HDL Cholesterol 69  67    VLDL Cholesterol 14  10    LDL Cholesterol  78  77    LDL/HDL Ratio 1.13  1.16       Lab Results   Component Value Date    TSH 1.350 09/26/2022    TSH 1.100 01/17/2019      Lab Results   Component Value Date    FREET4 0.97 09/26/2022    FREET4 1.1 01/17/2019      A1C Last 3 Results          10/9/2023    09:25   HGBA1C Last 3 Results   Hemoglobin A1C 5.90       PSA          10/9/2023    09:25   PSA   PSA 10.700                     Visit Diagnoses:    ICD-10-CM ICD-9-CM   1. TIA (transient ischemic attack)  G45.9 435.9   2. Elevated PSA  R97.20 790.93   3. Hypertension, essential  I10 401.9   4. Positive colorectal cancer screening using Cologuard test  R19.5 787.7   5. Attention deficit hyperactivity disorder (ADHD), combined type  F90.2 314.01   6. IFG (impaired fasting glucose)  R73.01 " 790.21   7. Screening PSA (prostate specific antigen)  Z12.5 V76.44       Assessment and Plan   Diagnoses and all orders for this visit:    1. TIA (transient ischemic attack) (Primary)  -     CBC & Differential; Future  -     Lipid Panel; Future  -     PSA DIAGNOSTIC; Future  -     Hemoglobin A1c; Future  -     Comprehensive Metabolic Panel; Future    2. Elevated PSA  -     CBC & Differential; Future  -     Lipid Panel; Future  -     PSA DIAGNOSTIC; Future  -     Hemoglobin A1c; Future  -     Comprehensive Metabolic Panel; Future    3. Hypertension, essential  -     CBC & Differential; Future  -     Lipid Panel; Future  -     PSA DIAGNOSTIC; Future  -     Hemoglobin A1c; Future  -     Comprehensive Metabolic Panel; Future    4. Positive colorectal cancer screening using Cologuard test  -     CBC & Differential; Future  -     Lipid Panel; Future  -     PSA DIAGNOSTIC; Future  -     Hemoglobin A1c; Future  -     Comprehensive Metabolic Panel; Future    5. Attention deficit hyperactivity disorder (ADHD), combined type  -     CBC & Differential; Future  -     Lipid Panel; Future  -     PSA DIAGNOSTIC; Future  -     Hemoglobin A1c; Future  -     Comprehensive Metabolic Panel; Future    6. IFG (impaired fasting glucose)  -     CBC & Differential; Future  -     Lipid Panel; Future  -     PSA DIAGNOSTIC; Future  -     Hemoglobin A1c; Future  -     Comprehensive Metabolic Panel; Future    7. Screening PSA (prostate specific antigen)  -     CBC & Differential; Future  -     Lipid Panel; Future  -     PSA DIAGNOSTIC; Future  -     Hemoglobin A1c; Future  -     Comprehensive Metabolic Panel; Future    Other orders  -     triamcinolone (KENALOG) 0.1 % cream; Apply 1 application  topically to the appropriate area as directed 2 (Two) Times a Day.  Dispense: 45 g; Refill: 2    Index finger nail avulsions from previous trauma, currently with scaly skin excoriations, recommend cortisone cream and Vaseline if necessary, discussed  October 17, 2023     IFG --hemoglobin A1c 5.9 October 9, 2023 never could get into endo, overall better     Hypertension continues lisinopril 10 mg daily,    TIA, approximately 2018,--- continues aspirin daily--Crestor 5 mg daily since October 10, 2022, along with co-Q10    Elevated PSA== elevated to 10.7 October 9, 2023, will refer back to urology, previously -----6.9 oct 2022, --- was referred to Dr. Valadez, MRI of the prostate showed enlarged prostate with some evidence of acute and chronic prostatitis December 6, 2022, patient is getting get back into Dr. Valadez as soon as possible    ADHD, continues Vyvanse 40 mg a.m., and Adderall  15 mg in the afternoon,    Mixed hyperlipidemia, continues Crestor 5 mg daily    Bilateral partial knee replacements    Follow Up   Return in about 6 months (around 4/17/2024).  Patient was given instructions and counseling regarding his condition or for health maintenance advice. Please see specific information pulled into the AVS if appropriate.         Answers submitted by the patient for this visit:  Primary Reason for Visit (Submitted on 10/10/2023)  What is the primary reason for your visit?: Physical

## 2023-10-17 NOTE — TELEPHONE ENCOUNTER
PT CALLED BACK AND I SCHEDULED HIS APPT FOR 11/28 WITH DR FAJARDO. CAN YOU PUT IN AN ORDER FOR THE PSA, PT AWARE TO GET THAT DONE THE WEEK BEFORE.

## 2023-10-17 NOTE — TELEPHONE ENCOUNTER
----- Message from Anhsadie Lovettinger sent at 10/17/2023  1:07 PM EDT -----  Regarding: FW: Appointment Request  Contact: [phone]  Sometime in the next 4-6 weeks, he should repeat PSA prior to coming in  ----- Message -----  From: Alberto Wilde RegSched Rep  Sent: 10/17/2023  11:34 AM EDT  To: Anh Trejo  Subject: FW: Appointment Request                          PLEASE ADVISE ON APPT??    ----- Message -----  From: Alec Dunlap  Sent: 10/17/2023  11:27 AM EDT  To: [facility]  Subject: Appointment Request                              Appointment Request From: Alec Dunlap    With Provider: Gio Valadez [[facility]]    Preferred Date Range: Any    Preferred Times: Monday Morning, Tuesday Morning    Reason for visit: Follow-up    Comments:  Dr Sewell requested visit due to 10.7 PSA screening

## 2023-10-19 DIAGNOSIS — F90.2 ATTENTION DEFICIT HYPERACTIVITY DISORDER (ADHD), COMBINED TYPE: ICD-10-CM

## 2023-10-19 RX ORDER — LISDEXAMFETAMINE DIMESYLATE CAPSULES 40 MG/1
40 CAPSULE ORAL EVERY MORNING
Qty: 30 CAPSULE | Refills: 0 | Status: SHIPPED | OUTPATIENT
Start: 2023-10-19 | End: 2023-11-18

## 2023-10-19 RX ORDER — DEXTROAMPHETAMINE SACCHARATE, AMPHETAMINE ASPARTATE, DEXTROAMPHETAMINE SULFATE AND AMPHETAMINE SULFATE 3.75; 3.75; 3.75; 3.75 MG/1; MG/1; MG/1; MG/1
15 TABLET ORAL DAILY
Qty: 30 TABLET | Refills: 0 | Status: SHIPPED | OUTPATIENT
Start: 2023-10-19 | End: 2023-11-18

## 2023-11-16 DIAGNOSIS — F90.2 ATTENTION DEFICIT HYPERACTIVITY DISORDER (ADHD), COMBINED TYPE: ICD-10-CM

## 2023-11-16 RX ORDER — DEXTROAMPHETAMINE SACCHARATE, AMPHETAMINE ASPARTATE, DEXTROAMPHETAMINE SULFATE AND AMPHETAMINE SULFATE 3.75; 3.75; 3.75; 3.75 MG/1; MG/1; MG/1; MG/1
15 TABLET ORAL DAILY
Qty: 30 TABLET | Refills: 0 | Status: SHIPPED | OUTPATIENT
Start: 2023-11-16 | End: 2023-12-16

## 2023-11-16 RX ORDER — LISDEXAMFETAMINE DIMESYLATE CAPSULES 40 MG/1
40 CAPSULE ORAL EVERY MORNING
Qty: 30 CAPSULE | Refills: 0 | Status: SHIPPED | OUTPATIENT
Start: 2023-11-16 | End: 2023-12-16

## 2023-11-21 ENCOUNTER — LAB (OUTPATIENT)
Dept: LAB | Facility: HOSPITAL | Age: 68
End: 2023-11-21
Payer: COMMERCIAL

## 2023-11-21 DIAGNOSIS — R97.20 ELEVATED PROSTATE SPECIFIC ANTIGEN (PSA): ICD-10-CM

## 2023-11-21 LAB — PSA SERPL-MCNC: 6.25 NG/ML (ref 0–4)

## 2023-11-21 PROCEDURE — 84153 ASSAY OF PSA TOTAL: CPT

## 2023-11-21 PROCEDURE — 36415 COLL VENOUS BLD VENIPUNCTURE: CPT

## 2023-11-26 NOTE — PROGRESS NOTES
Chief Complaint: Urologic complaint    Subjective         History of Present Illness  Alec Dunlap is a 68 y.o. male       Elevated PSA    Follows up today as he had elevated PSA last month.  On repeat lower    Voiding okay.  No prostate meds.      No GH    No recent prostatitis or prostatitis symptoms.      PVR    11/23   032  11/22   088      9/22 0.8,   GFR  93         No cardiopulmonary history.  Non-smoker.          Previous    1 kidney stone many years ago    Father with prostate cancer,   no history of urologic surgery.    Strong family history of CAD          PSA    11/23   6.2     10/23   10.7    12/5/2022 MRI -97 g   -  Negative, prostatitis, thickened bladder wall.  PSAd  0.07  9/22   6.9  6/22   7.3  11/21 6.0  7/20   4.2      Patient has had his PSA checked for several years has been as high as 9 in the past.  Never been seen by urologist   before          Results for orders placed or performed in visit on 11/21/23   PSA Diagnostic    Specimen: Blood   Result Value Ref Range    PSA 6.250 (H) 0.000 - 4.000 ng/mL         Objective     Past Medical History:   Diagnosis Date    ADHD (attention deficit hyperactivity disorder) 3/01/2021    Colon polyp last visit    Erectile dysfunction 01/01/2019    Hypertension     Infectious viral hepatitis i was about 10 years old    TIA (transient ischemic attack)        Past Surgical History:   Procedure Laterality Date    CARPAL TUNNEL RELEASE      COLONOSCOPY  7 years    COLONOSCOPY N/A 10/18/2022    Procedure: COLONOSCOPY with APC COAGULTION, AND HOT SNARE POLYPECTOMY;  Surgeon: Jb Escoto MD;  Location: [facility] ENDOSCOPY;  Service: Gastroenterology;  Laterality: N/A;  COLON POLYP, DIVERTICULOSIS, AVM    HERNIA REPAIR      JOINT REPLACEMENT  2018    KNEE ARTHROPLASTY, PARTIAL REPLACEMENT Bilateral     SHOULDER SURGERY      VASECTOMY  1982         Current Outpatient Medications:     amphetamine-dextroamphetamine (Adderall) 15 MG tablet, Take 1 tablet  by mouth Daily for 30 days., Disp: 30 tablet, Rfl: 0    aspirin 325 MG tablet, Take 1 tablet by mouth Daily., Disp: , Rfl:     clotrimazole-betamethasone (Lotrisone) 1-0.05 % cream, Apply 1 application topically to the appropriate area as directed 2 (Two) Times a Day., Disp: 45 g, Rfl: 5    lisdexamfetamine (Vyvanse) 40 MG capsule, Take 1 capsule by mouth Every Morning for 30 days, Disp: 30 capsule, Rfl: 0    lisinopril (PRINIVIL,ZESTRIL) 10 MG tablet, TAKE 1 TABLET DAILY, Disp: 90 tablet, Rfl: 3    multivitamin with minerals tablet tablet, Take 1 tablet by mouth Daily., Disp: , Rfl:     nystatin (MYCOSTATIN) 109205 UNIT/GM cream, Apply 1 application topically to the appropriate area as directed 2 (Two) Times a Day., Disp: 3 g, Rfl: 1    Omega-3 Fatty Acids (fish oil) 1000 MG capsule capsule, Take  by mouth Daily With Breakfast., Disp: , Rfl:     rosuvastatin (Crestor) 5 MG tablet, Take 1 tablet by mouth Daily., Disp: 90 tablet, Rfl: 3    terbinafine (LamISIL AT) 1 % cream, Apply 1 application topically to the appropriate area as directed 2 (Two) Times a Day., Disp: 45 g, Rfl: 5    triamcinolone (KENALOG) 0.1 % cream, Apply 1 application  topically to the appropriate area as directed 2 (Two) Times a Day., Disp: 45 g, Rfl: 2    Allergies   Allergen Reactions    Gabapentin Rash    Pregabalin Rash        Family History   Problem Relation Age of Onset    Heart disease Father     Colon cancer Neg Hx        Social History     Socioeconomic History    Marital status:    Tobacco Use    Smoking status: Former     Packs/day: 2.00     Years: 7.00     Additional pack years: 0.00     Total pack years: 14.00     Types: Cigarettes     Start date: 1973     Quit date: 1979     Years since quittin.9     Passive exposure: Never    Smokeless tobacco: Never   Vaping Use    Vaping Use: Never used   Substance and Sexual Activity    Alcohol use: Not Currently     Alcohol/week: 6.0 standard drinks of alcohol     Types: 6  Glasses of wine per week    Drug use: Never    Sexual activity: Not Currently     Partners: Female       Vital Signs:   There were no vitals taken for this visit.                   Assessment and Plan    Diagnoses and all orders for this visit:    1. Elevated PSA (Primary)        PSA had spiked, on repeat was back down to more normal levels.  PSA density within normal limits.  Patient given reassurance      At this time patient is voiding without issue.  We discussed prostate medication but he is doing okay no need for medication at this time    Can go back to normal prostate cancer screening      PCP is rechecking PSA in April.    Follow-up with us as needed

## 2023-11-28 ENCOUNTER — OFFICE VISIT (OUTPATIENT)
Dept: UROLOGY | Facility: CLINIC | Age: 68
End: 2023-11-28
Payer: COMMERCIAL

## 2023-11-28 VITALS — BODY MASS INDEX: 29.63 KG/M2 | HEIGHT: 70 IN | WEIGHT: 207 LBS

## 2023-11-28 DIAGNOSIS — R97.20 ELEVATED PSA: Primary | ICD-10-CM

## 2023-11-28 DIAGNOSIS — N40.1 BENIGN PROSTATIC HYPERPLASIA WITH LOWER URINARY TRACT SYMPTOMS, SYMPTOM DETAILS UNSPECIFIED: ICD-10-CM

## 2023-11-28 LAB — URINE VOLUME: NORMAL

## 2023-11-28 NOTE — PROGRESS NOTES
Bladder Scan interpretation 11/28/2023    Estimation of residual urine via BVI 3000 Verathon Bladder Scan  MA/nurse performing: Kamini JHAVERI  Residual Urine: 32 ml  Indication: Elevated PSA    Benign prostatic hyperplasia with lower urinary tract symptoms, symptom details unspecified   Position: Supine  Examination: Incremental scanning of the suprapubic area using 2.0 MHz transducer using copious amounts of acoustic gel.   Findings: An anechoic area was demonstrated which represented the bladder, with measurement of residual urine as noted. I inspected this myself. In that the residual urine was stable or insignificant, refer to plan for treatment and plan necessary at this time.

## 2023-12-15 DIAGNOSIS — F90.2 ATTENTION DEFICIT HYPERACTIVITY DISORDER (ADHD), COMBINED TYPE: ICD-10-CM

## 2023-12-15 RX ORDER — DEXTROAMPHETAMINE SACCHARATE, AMPHETAMINE ASPARTATE, DEXTROAMPHETAMINE SULFATE AND AMPHETAMINE SULFATE 3.75; 3.75; 3.75; 3.75 MG/1; MG/1; MG/1; MG/1
15 TABLET ORAL DAILY
Qty: 30 TABLET | Refills: 0 | Status: SHIPPED | OUTPATIENT
Start: 2023-12-15 | End: 2024-01-14

## 2023-12-15 RX ORDER — LISDEXAMFETAMINE DIMESYLATE CAPSULES 40 MG/1
40 CAPSULE ORAL EVERY MORNING
Qty: 30 CAPSULE | Refills: 0 | Status: SHIPPED | OUTPATIENT
Start: 2023-12-15 | End: 2024-01-14

## 2023-12-18 ENCOUNTER — TELEPHONE (OUTPATIENT)
Dept: INTERNAL MEDICINE | Facility: CLINIC | Age: 68
End: 2023-12-18
Payer: COMMERCIAL

## 2023-12-21 DIAGNOSIS — F90.2 ATTENTION DEFICIT HYPERACTIVITY DISORDER (ADHD), COMBINED TYPE: ICD-10-CM

## 2023-12-21 RX ORDER — LISDEXAMFETAMINE DIMESYLATE CAPSULES 40 MG/1
40 CAPSULE ORAL EVERY MORNING
Qty: 30 CAPSULE | Refills: 0 | Status: SHIPPED | OUTPATIENT
Start: 2023-12-21 | End: 2024-01-20

## 2023-12-26 RX ORDER — ROSUVASTATIN CALCIUM 5 MG/1
5 TABLET, COATED ORAL DAILY
Qty: 90 TABLET | Refills: 3 | Status: SHIPPED | OUTPATIENT
Start: 2023-12-26

## 2024-01-17 DIAGNOSIS — F90.2 ATTENTION DEFICIT HYPERACTIVITY DISORDER (ADHD), COMBINED TYPE: ICD-10-CM

## 2024-01-18 RX ORDER — LISDEXAMFETAMINE DIMESYLATE CAPSULES 40 MG/1
40 CAPSULE ORAL EVERY MORNING
Qty: 30 CAPSULE | Refills: 0 | Status: SHIPPED | OUTPATIENT
Start: 2024-01-18 | End: 2024-02-17

## 2024-01-18 RX ORDER — DEXTROAMPHETAMINE SACCHARATE, AMPHETAMINE ASPARTATE, DEXTROAMPHETAMINE SULFATE AND AMPHETAMINE SULFATE 3.75; 3.75; 3.75; 3.75 MG/1; MG/1; MG/1; MG/1
15 TABLET ORAL DAILY
Qty: 30 TABLET | Refills: 0 | Status: SHIPPED | OUTPATIENT
Start: 2024-01-18 | End: 2024-02-17

## 2024-01-22 ENCOUNTER — TELEPHONE (OUTPATIENT)
Dept: INTERNAL MEDICINE | Facility: CLINIC | Age: 69
End: 2024-01-22
Payer: COMMERCIAL

## 2024-01-22 DIAGNOSIS — B00.1 COLD SORE: Primary | ICD-10-CM

## 2024-01-22 RX ORDER — ACYCLOVIR 400 MG/1
400 TABLET ORAL 2 TIMES DAILY
Qty: 10 TABLET | Refills: 0 | Status: SHIPPED | OUTPATIENT
Start: 2024-01-22

## 2024-01-22 NOTE — TELEPHONE ENCOUNTER
"Caller: Alec Dunlap    Relationship: Self    Best call back number: [phone]     What medication are you requesting: \"ACYCLOVIR 400 MG 2 TABLETS TWICE DAILY FOR FIVE DAYS\"    What are your current symptoms: COLD SORE    How long have you been experiencing symptoms: SINCE YESTERDAY    Have you had these symptoms before:    [x] Yes  [] No    Have you been treated for these symptoms before:   [x] Yes  [] No    If a prescription is needed, what is your preferred pharmacy and phone number: [facility] - [place], [place] - [address] - [phone]  - [phone]      Additional notes:   PATIENT STATES THAT THIS IS THE PRESCRIPTION THAT HIS PREVIOUS PROVIDER WROTE FOR HIM. HE STATES THAT HE HAS A HISTORY OF COLD SORES.       PATIENT STATES THAT HE CAN COME IN FOR AN APPOINTMENT IF NEEDED, PLEASE CALL TO LET HIM KNOW.   "

## 2024-02-19 DIAGNOSIS — F90.2 ATTENTION DEFICIT HYPERACTIVITY DISORDER (ADHD), COMBINED TYPE: ICD-10-CM

## 2024-02-19 RX ORDER — DEXTROAMPHETAMINE SACCHARATE, AMPHETAMINE ASPARTATE, DEXTROAMPHETAMINE SULFATE AND AMPHETAMINE SULFATE 3.75; 3.75; 3.75; 3.75 MG/1; MG/1; MG/1; MG/1
15 TABLET ORAL DAILY
Qty: 30 TABLET | Refills: 0 | Status: SHIPPED | OUTPATIENT
Start: 2024-02-19 | End: 2024-03-20

## 2024-02-19 RX ORDER — LISDEXAMFETAMINE DIMESYLATE CAPSULES 40 MG/1
40 CAPSULE ORAL EVERY MORNING
Qty: 30 CAPSULE | Refills: 0 | Status: SHIPPED | OUTPATIENT
Start: 2024-02-19 | End: 2024-03-20

## 2024-03-20 DIAGNOSIS — F90.2 ATTENTION DEFICIT HYPERACTIVITY DISORDER (ADHD), COMBINED TYPE: ICD-10-CM

## 2024-03-21 RX ORDER — DEXTROAMPHETAMINE SACCHARATE, AMPHETAMINE ASPARTATE, DEXTROAMPHETAMINE SULFATE AND AMPHETAMINE SULFATE 3.75; 3.75; 3.75; 3.75 MG/1; MG/1; MG/1; MG/1
15 TABLET ORAL DAILY
Qty: 30 TABLET | Refills: 0 | Status: SHIPPED | OUTPATIENT
Start: 2024-03-21 | End: 2024-04-20

## 2024-03-21 RX ORDER — LISDEXAMFETAMINE DIMESYLATE CAPSULES 40 MG/1
40 CAPSULE ORAL EVERY MORNING
Qty: 30 CAPSULE | Refills: 0 | Status: SHIPPED | OUTPATIENT
Start: 2024-03-21 | End: 2024-04-20

## 2024-04-05 ENCOUNTER — LAB (OUTPATIENT)
Dept: LAB | Facility: HOSPITAL | Age: 69
End: 2024-04-05
Payer: COMMERCIAL

## 2024-04-05 DIAGNOSIS — R73.01 IFG (IMPAIRED FASTING GLUCOSE): ICD-10-CM

## 2024-04-05 DIAGNOSIS — R19.5 POSITIVE COLORECTAL CANCER SCREENING USING COLOGUARD TEST: ICD-10-CM

## 2024-04-05 DIAGNOSIS — Z12.5 SCREENING PSA (PROSTATE SPECIFIC ANTIGEN): ICD-10-CM

## 2024-04-05 DIAGNOSIS — I10 HYPERTENSION, ESSENTIAL: ICD-10-CM

## 2024-04-05 DIAGNOSIS — G45.9 TIA (TRANSIENT ISCHEMIC ATTACK): ICD-10-CM

## 2024-04-05 DIAGNOSIS — R97.20 ELEVATED PSA: ICD-10-CM

## 2024-04-05 DIAGNOSIS — F90.2 ATTENTION DEFICIT HYPERACTIVITY DISORDER (ADHD), COMBINED TYPE: ICD-10-CM

## 2024-04-05 LAB
ALBUMIN SERPL-MCNC: 4.4 G/DL (ref 3.5–5.2)
ALBUMIN/GLOB SERPL: 1.6 G/DL
ALP SERPL-CCNC: 68 U/L (ref 39–117)
ALT SERPL W P-5'-P-CCNC: 15 U/L (ref 1–41)
ANION GAP SERPL CALCULATED.3IONS-SCNC: 10.4 MMOL/L (ref 5–15)
AST SERPL-CCNC: 21 U/L (ref 1–40)
BASOPHILS # BLD AUTO: 0.06 10*3/MM3 (ref 0–0.2)
BASOPHILS NFR BLD AUTO: 1 % (ref 0–1.5)
BILIRUB SERPL-MCNC: 1 MG/DL (ref 0–1.2)
BUN SERPL-MCNC: 9 MG/DL (ref 8–23)
BUN/CREAT SERPL: 9.3 (ref 7–25)
CALCIUM SPEC-SCNC: 8.9 MG/DL (ref 8.6–10.5)
CHLORIDE SERPL-SCNC: 104 MMOL/L (ref 98–107)
CHOLEST SERPL-MCNC: 136 MG/DL (ref 0–200)
CO2 SERPL-SCNC: 25.6 MMOL/L (ref 22–29)
CREAT SERPL-MCNC: 0.97 MG/DL (ref 0.76–1.27)
DEPRECATED RDW RBC AUTO: 44.4 FL (ref 37–54)
EGFRCR SERPLBLD CKD-EPI 2021: 85 ML/MIN/1.73
EOSINOPHIL # BLD AUTO: 0.37 10*3/MM3 (ref 0–0.4)
EOSINOPHIL NFR BLD AUTO: 5.9 % (ref 0.3–6.2)
ERYTHROCYTE [DISTWIDTH] IN BLOOD BY AUTOMATED COUNT: 13.1 % (ref 12.3–15.4)
GLOBULIN UR ELPH-MCNC: 2.8 GM/DL
GLUCOSE SERPL-MCNC: 95 MG/DL (ref 65–99)
HBA1C MFR BLD: 5.8 % (ref 4.8–5.6)
HCT VFR BLD AUTO: 42.3 % (ref 37.5–51)
HDLC SERPL-MCNC: 68 MG/DL (ref 40–60)
HGB BLD-MCNC: 13.9 G/DL (ref 13–17.7)
IMM GRANULOCYTES # BLD AUTO: 0.04 10*3/MM3 (ref 0–0.05)
IMM GRANULOCYTES NFR BLD AUTO: 0.6 % (ref 0–0.5)
LDLC SERPL CALC-MCNC: 60 MG/DL (ref 0–100)
LDLC/HDLC SERPL: 0.91 {RATIO}
LYMPHOCYTES # BLD AUTO: 1.89 10*3/MM3 (ref 0.7–3.1)
LYMPHOCYTES NFR BLD AUTO: 30.2 % (ref 19.6–45.3)
MCH RBC QN AUTO: 29.8 PG (ref 26.6–33)
MCHC RBC AUTO-ENTMCNC: 32.9 G/DL (ref 31.5–35.7)
MCV RBC AUTO: 90.6 FL (ref 79–97)
MONOCYTES # BLD AUTO: 0.7 10*3/MM3 (ref 0.1–0.9)
MONOCYTES NFR BLD AUTO: 11.2 % (ref 5–12)
NEUTROPHILS NFR BLD AUTO: 3.19 10*3/MM3 (ref 1.7–7)
NEUTROPHILS NFR BLD AUTO: 51.1 % (ref 42.7–76)
NRBC BLD AUTO-RTO: 0 /100 WBC (ref 0–0.2)
PLATELET # BLD AUTO: 266 10*3/MM3 (ref 140–450)
PMV BLD AUTO: 11.6 FL (ref 6–12)
POTASSIUM SERPL-SCNC: 4.7 MMOL/L (ref 3.5–5.2)
PROT SERPL-MCNC: 7.2 G/DL (ref 6–8.5)
PSA SERPL-MCNC: 13.8 NG/ML (ref 0–4)
RBC # BLD AUTO: 4.67 10*6/MM3 (ref 4.14–5.8)
SODIUM SERPL-SCNC: 140 MMOL/L (ref 136–145)
TRIGL SERPL-MCNC: 30 MG/DL (ref 0–150)
VLDLC SERPL-MCNC: 8 MG/DL (ref 5–40)
WBC NRBC COR # BLD AUTO: 6.25 10*3/MM3 (ref 3.4–10.8)

## 2024-04-05 PROCEDURE — 80053 COMPREHEN METABOLIC PANEL: CPT

## 2024-04-05 PROCEDURE — 36415 COLL VENOUS BLD VENIPUNCTURE: CPT

## 2024-04-05 PROCEDURE — 80061 LIPID PANEL: CPT

## 2024-04-05 PROCEDURE — 84153 ASSAY OF PSA TOTAL: CPT

## 2024-04-05 PROCEDURE — 83036 HEMOGLOBIN GLYCOSYLATED A1C: CPT

## 2024-04-05 PROCEDURE — 85025 COMPLETE CBC W/AUTO DIFF WBC: CPT

## 2024-04-15 ENCOUNTER — OFFICE VISIT (OUTPATIENT)
Dept: INTERNAL MEDICINE | Age: 69
End: 2024-04-15
Payer: COMMERCIAL

## 2024-04-15 VITALS
WEIGHT: 210 LBS | HEART RATE: 90 BPM | BODY MASS INDEX: 30.06 KG/M2 | OXYGEN SATURATION: 99 % | HEIGHT: 70 IN | TEMPERATURE: 98.2 F

## 2024-04-15 DIAGNOSIS — R19.5 POSITIVE COLORECTAL CANCER SCREENING USING COLOGUARD TEST: ICD-10-CM

## 2024-04-15 DIAGNOSIS — Z00.00 PHYSICAL EXAM, ANNUAL: ICD-10-CM

## 2024-04-15 DIAGNOSIS — B00.1 COLD SORE: ICD-10-CM

## 2024-04-15 DIAGNOSIS — R97.20 ELEVATED PSA: ICD-10-CM

## 2024-04-15 DIAGNOSIS — F90.2 ATTENTION DEFICIT HYPERACTIVITY DISORDER (ADHD), COMBINED TYPE: Primary | ICD-10-CM

## 2024-04-15 DIAGNOSIS — G45.9 TIA (TRANSIENT ISCHEMIC ATTACK): ICD-10-CM

## 2024-04-15 DIAGNOSIS — R73.01 IFG (IMPAIRED FASTING GLUCOSE): ICD-10-CM

## 2024-04-15 DIAGNOSIS — I10 HYPERTENSION, ESSENTIAL: ICD-10-CM

## 2024-04-15 DIAGNOSIS — Z12.5 SCREENING PSA (PROSTATE SPECIFIC ANTIGEN): ICD-10-CM

## 2024-04-15 DIAGNOSIS — N40.1 BENIGN PROSTATIC HYPERPLASIA WITH LOWER URINARY TRACT SYMPTOMS, SYMPTOM DETAILS UNSPECIFIED: ICD-10-CM

## 2024-04-15 PROCEDURE — 99397 PER PM REEVAL EST PAT 65+ YR: CPT | Performed by: INTERNAL MEDICINE

## 2024-04-15 RX ORDER — ACYCLOVIR 400 MG/1
400 TABLET ORAL 2 TIMES DAILY
Qty: 10 TABLET | Refills: 2 | Status: SHIPPED | OUTPATIENT
Start: 2024-04-15

## 2024-04-15 NOTE — PROGRESS NOTES
"CHIEF COMPLAINT/ HPI: Patient is here to follow-up with lab work recently, says he is doing well otherwise,  Hypertension (Routine follow up, Lab follow up, med refill. PSA concern. )              Objective   Vital Signs  Vitals:    04/15/24 1206   Pulse: 90   Temp: 98.2 °F (36.8 °C)   SpO2: 99%   Weight: 95.3 kg (210 lb)   Height: 177.8 cm (70\")      Body mass index is 30.13 kg/m².  Review of Systems   Constitutional: Negative.    HENT: Negative.     Eyes: Negative.    Respiratory: Negative.     Cardiovascular: Negative.    Gastrointestinal: Negative.    Endocrine: Negative.    Genitourinary: Negative.    Musculoskeletal: Negative.    Allergic/Immunologic: Negative.    Neurological: Negative.    Hematological: Negative.    Psychiatric/Behavioral: Negative.        Physical Exam  Constitutional:       General: He is not in acute distress.     Appearance: Normal appearance.   HENT:      Head: Normocephalic.      Mouth/Throat:      Mouth: Mucous membranes are moist.   Eyes:      Conjunctiva/sclera: Conjunctivae normal.      Pupils: Pupils are equal, round, and reactive to light.   Cardiovascular:      Rate and Rhythm: Normal rate and regular rhythm.      Pulses: Normal pulses.      Heart sounds: Normal heart sounds.   Pulmonary:      Effort: Pulmonary effort is normal.      Breath sounds: Normal breath sounds.   Abdominal:      General: Abdomen is flat. Bowel sounds are normal.      Palpations: Abdomen is soft.   Musculoskeletal:         General: No swelling. Normal range of motion.      Cervical back: Neck supple.   Skin:     General: Skin is warm and dry.      Coloration: Skin is not jaundiced.   Neurological:      General: No focal deficit present.      Mental Status: He is alert and oriented to person, place, and time. Mental status is at baseline.   Psychiatric:         Mood and Affect: Mood normal.         Behavior: Behavior normal.         Thought Content: Thought content normal.         Judgment: Judgment " "normal.        Result Review :   No results found for: \"PROBNP\", \"BNP\"  CMP          10/9/2023    09:25 4/5/2024    09:41   CMP   Glucose 93  95    BUN 15  9    Creatinine 0.95  0.97    EGFR 87.2  85.0    Sodium 140  140    Potassium 4.5  4.7    Chloride 103  104    Calcium 9.6  8.9    Total Protein 7.2  7.2    Albumin 4.5  4.4    Globulin 2.7  2.8    Total Bilirubin 0.7  1.0    Alkaline Phosphatase 75  68    AST (SGOT) 20  21    ALT (SGPT) 21  15    Albumin/Globulin Ratio 1.7  1.6    BUN/Creatinine Ratio 15.8  9.3    Anion Gap 12.5  10.4      CBC w/diff          10/9/2023    09:25 4/5/2024    09:41   CBC w/Diff   WBC 8.78  6.25    RBC 4.68  4.67    Hemoglobin 14.1  13.9    Hematocrit 42.4  42.3    MCV 90.6  90.6    MCH 30.1  29.8    MCHC 33.3  32.9    RDW 13.0  13.1    Platelets 276  266    Neutrophil Rel % 57.2  51.1    Immature Granulocyte Rel % 0.7  0.6    Lymphocyte Rel % 25.3  30.2    Monocyte Rel % 9.2  11.2    Eosinophil Rel % 6.8  5.9    Basophil Rel % 0.8  1.0       Lipid Panel          10/9/2023    09:25 4/5/2024    09:41   Lipid Panel   Total Cholesterol 154  136    Triglycerides 48  30    HDL Cholesterol 67  68    VLDL Cholesterol 10  8    LDL Cholesterol  77  60    LDL/HDL Ratio 1.16  0.91       Lab Results   Component Value Date    TSH 1.350 09/26/2022    TSH 1.100 01/17/2019      Lab Results   Component Value Date    FREET4 0.97 09/26/2022    FREET4 1.1 01/17/2019      A1C Last 3 Results          10/9/2023    09:25 4/5/2024    09:41   HGBA1C Last 3 Results   Hemoglobin A1C 5.90  5.80       PSA          10/9/2023    09:25 11/21/2023    11:08 4/5/2024    09:41   PSA   PSA 10.700  6.250  13.800                     Visit Diagnoses:    ICD-10-CM ICD-9-CM   1. Attention deficit hyperactivity disorder (ADHD), combined type  F90.2 314.01   2. Cold sore  B00.1 054.9   3. Hypertension, essential  I10 401.9   4. IFG (impaired fasting glucose)  R73.01 790.21   5. Screening PSA (prostate specific antigen)  Z12.5 " V76.44   6. Positive colorectal cancer screening using Cologuard test  R19.5 787.7   7. TIA (transient ischemic attack)  G45.9 435.9   8. Benign prostatic hyperplasia with lower urinary tract symptoms, symptom details unspecified  N40.1 600.01   9. Elevated PSA  R97.20 790.93   10. Physical exam, annual  Z00.00 V70.0       Assessment and Plan   Diagnoses and all orders for this visit:    1. Attention deficit hyperactivity disorder (ADHD), combined type (Primary)  -     CBC & Differential; Future  -     Comprehensive Metabolic Panel; Future  -     Hemoglobin A1c; Future  -     Lipid Panel; Future  -     PSA DIAGNOSTIC; Future    2. Cold sore  -     acyclovir (Zovirax) 400 MG tablet; Take 1 tablet by mouth 2 (Two) Times a Day. Take no more than 5 doses a day.  Dispense: 10 tablet; Refill: 2  -     CBC & Differential; Future  -     Comprehensive Metabolic Panel; Future  -     Hemoglobin A1c; Future  -     Lipid Panel; Future  -     PSA DIAGNOSTIC; Future    3. Hypertension, essential  -     CBC & Differential; Future  -     Comprehensive Metabolic Panel; Future  -     Hemoglobin A1c; Future  -     Lipid Panel; Future  -     PSA DIAGNOSTIC; Future    4. IFG (impaired fasting glucose)  -     CBC & Differential; Future  -     Comprehensive Metabolic Panel; Future  -     Hemoglobin A1c; Future  -     Lipid Panel; Future  -     PSA DIAGNOSTIC; Future    5. Screening PSA (prostate specific antigen)  -     CBC & Differential; Future  -     Comprehensive Metabolic Panel; Future  -     Hemoglobin A1c; Future  -     Lipid Panel; Future  -     PSA DIAGNOSTIC; Future    6. Positive colorectal cancer screening using Cologuard test  -     CBC & Differential; Future  -     Comprehensive Metabolic Panel; Future  -     Hemoglobin A1c; Future  -     Lipid Panel; Future  -     PSA DIAGNOSTIC; Future    7. TIA (transient ischemic attack)  -     CBC & Differential; Future  -     Comprehensive Metabolic Panel; Future  -     Hemoglobin A1c;  Future  -     Lipid Panel; Future  -     PSA DIAGNOSTIC; Future    8. Benign prostatic hyperplasia with lower urinary tract symptoms, symptom details unspecified  -     CBC & Differential; Future  -     Comprehensive Metabolic Panel; Future  -     Hemoglobin A1c; Future  -     Lipid Panel; Future  -     PSA DIAGNOSTIC; Future    9. Elevated PSA  -     CBC & Differential; Future  -     Comprehensive Metabolic Panel; Future  -     Hemoglobin A1c; Future  -     Lipid Panel; Future  -     PSA DIAGNOSTIC; Future  -     Ambulatory Referral to Urology    10. Physical exam, annual  -     CBC & Differential; Future  -     Comprehensive Metabolic Panel; Future  -     Hemoglobin A1c; Future  -     Lipid Panel; Future  -     PSA DIAGNOSTIC; Future    Annual preventitive exam April 15, 2024--, discussed patient wears a seatbelt, does not smoke or drink, he is active he works every day, encouraged aerobic exercise and activity,    IFG --hemoglobin A1c 5.8===, April 2024      Hypertension continues lisinopril 10 mg daily,    TIA, approximately 2018,--- continues aspirin daily--Crestor 5 mg daily since October 10, 2022, along with co-Q10    Elevated PSA== back up to 13.8 March 5, 2024,------ elevated to 10.7 October 9, 2023, will refer back to urology, previously -----6.9 oct 2022, --- was referred to Dr. Valadez, MRI of the prostate showed enlarged prostate with some evidence of acute and chronic prostatitis December 6, 2022, patient is getting get back into Dr. Valadez as soon as possible    ADHD, continues Vyvanse 40 mg a.m., and Adderall  15 mg in the afternoon,    Mixed hyperlipidemia, continues Crestor 5 mg daily    Bilateral partial knee replacements    Shoulder arthritis, rotator cuff issues, sees Dr. Vasquez, had MRI of the shoulder March 2024    BMI is >= 30 and <35. (Class 1 Obesity). The following options were offered after discussion;: exercise counseling/recommendations    Colonoscopy October 18, 2022 dr otoole              Follow Up   Return in about 6 months (around 10/15/2024).  Patient was given instructions and counseling regarding his condition or for health maintenance advice. Please see specific information pulled into the AVS if appropriate.

## 2024-04-18 DIAGNOSIS — F90.2 ATTENTION DEFICIT HYPERACTIVITY DISORDER (ADHD), COMBINED TYPE: ICD-10-CM

## 2024-04-18 RX ORDER — LISDEXAMFETAMINE DIMESYLATE CAPSULES 40 MG/1
40 CAPSULE ORAL EVERY MORNING
Qty: 30 CAPSULE | Refills: 0 | Status: SHIPPED | OUTPATIENT
Start: 2024-04-18 | End: 2024-05-18

## 2024-04-18 RX ORDER — DEXTROAMPHETAMINE SACCHARATE, AMPHETAMINE ASPARTATE, DEXTROAMPHETAMINE SULFATE AND AMPHETAMINE SULFATE 3.75; 3.75; 3.75; 3.75 MG/1; MG/1; MG/1; MG/1
15 TABLET ORAL DAILY
Qty: 30 TABLET | Refills: 0 | Status: SHIPPED | OUTPATIENT
Start: 2024-04-18 | End: 2024-05-18

## 2024-05-16 DIAGNOSIS — F90.2 ATTENTION DEFICIT HYPERACTIVITY DISORDER (ADHD), COMBINED TYPE: ICD-10-CM

## 2024-05-16 RX ORDER — DEXTROAMPHETAMINE SACCHARATE, AMPHETAMINE ASPARTATE, DEXTROAMPHETAMINE SULFATE AND AMPHETAMINE SULFATE 3.75; 3.75; 3.75; 3.75 MG/1; MG/1; MG/1; MG/1
15 TABLET ORAL DAILY
Qty: 30 TABLET | Refills: 0 | Status: SHIPPED | OUTPATIENT
Start: 2024-05-16 | End: 2024-06-15

## 2024-05-16 RX ORDER — LISDEXAMFETAMINE DIMESYLATE 40 MG/1
40 CAPSULE ORAL EVERY MORNING
Qty: 30 CAPSULE | Refills: 0 | Status: SHIPPED | OUTPATIENT
Start: 2024-05-16 | End: 2024-06-15

## 2024-05-21 NOTE — PROGRESS NOTES
Chief Complaint: Urologic complaint    Subjective         History of Present Illness  Alec Dunlap is a 68 y.o. male       Elevated PSA    Patient's PSA went back up on recent PCP check    Voiding okay.  No prostate meds.    A little weaker in the morning    Patient's been having some minor urge incontinence.  This is new.  Last 3 months.  No pads    No GH/dysuria    No prostatitis symptoms.    No history of prostatitis      PVR       032     088       0.8,   GFR  93         No cardiopulmonary history.  Non-smoker.            Previous    1 kidney stone many years ago    Father with prostate cancer,   no history of urologic surgery.    Strong family history of CAD          PSA        13.8      6.2     10/23   10.7    2022 MRI -97 g   -  Negative, prostatitis, thickened bladder wall.  PSAd  0.07     6.9     7.3   6.0     4.2      Patient has had his PSA checked for several years has been as high as 9 in the past.  Never been seen by urologist   before                Objective     Past Medical History:   Diagnosis Date    ADHD (attention deficit hyperactivity disorder) 3/01/2021    Colon polyp last visit    Erectile dysfunction 2019    Hypertension     Infectious viral hepatitis i was about 10 years old    TIA (transient ischemic attack)              Social History     Socioeconomic History    Marital status:    Tobacco Use    Smoking status: Former     Current packs/day: 0.00     Average packs/day: 2.0 packs/day for 7.0 years (14.0 ttl pk-yrs)     Types: Cigarettes     Start date: 1973     Quit date: 1979     Years since quittin.4     Passive exposure: Never    Smokeless tobacco: Never   Vaping Use    Vaping status: Never Used   Substance and Sexual Activity    Alcohol use: Not Currently     Alcohol/week: 6.0 standard drinks of alcohol     Types: 6 Glasses of wine per week    Drug use: Never    Sexual activity: Not Currently     Partners:  Female       Vital Signs:   There were no vitals taken for this visit.                   Assessment and Plan    Diagnoses and all orders for this visit:    1. Elevated PSA (Primary)        BPH    Patient was a little worse LUTS.  After discussion we will start him on Flomax 0.4 mg daily.  His mood is discussed          Elevated PSA    PSA  jumped up quite a bit  After discussion we will place him on Bactrim DS p.o. twice daily x 2 weeks.  Risk and benefits discussed      Follow-up in 6 weeks with PSA

## 2024-05-24 ENCOUNTER — OFFICE VISIT (OUTPATIENT)
Dept: UROLOGY | Facility: CLINIC | Age: 69
End: 2024-05-24
Payer: COMMERCIAL

## 2024-05-24 VITALS — HEIGHT: 70 IN | BODY MASS INDEX: 30.18 KG/M2 | WEIGHT: 210.8 LBS

## 2024-05-24 DIAGNOSIS — R97.20 ELEVATED PSA: Primary | ICD-10-CM

## 2024-05-24 DIAGNOSIS — N40.1 BENIGN PROSTATIC HYPERPLASIA WITH LOWER URINARY TRACT SYMPTOMS, SYMPTOM DETAILS UNSPECIFIED: ICD-10-CM

## 2024-05-24 RX ORDER — SULFAMETHOXAZOLE AND TRIMETHOPRIM 800; 160 MG/1; MG/1
1 TABLET ORAL 2 TIMES DAILY
Qty: 28 TABLET | Refills: 0 | Status: SHIPPED | OUTPATIENT
Start: 2024-05-24 | End: 2024-06-07

## 2024-05-24 RX ORDER — TAMSULOSIN HYDROCHLORIDE 0.4 MG/1
1 CAPSULE ORAL DAILY
Qty: 90 CAPSULE | Refills: 4 | Status: SHIPPED | OUTPATIENT
Start: 2024-05-24

## 2024-06-17 DIAGNOSIS — F90.2 ATTENTION DEFICIT HYPERACTIVITY DISORDER (ADHD), COMBINED TYPE: ICD-10-CM

## 2024-06-17 RX ORDER — DEXTROAMPHETAMINE SACCHARATE, AMPHETAMINE ASPARTATE, DEXTROAMPHETAMINE SULFATE AND AMPHETAMINE SULFATE 3.75; 3.75; 3.75; 3.75 MG/1; MG/1; MG/1; MG/1
15 TABLET ORAL DAILY
Qty: 30 TABLET | Refills: 0 | Status: SHIPPED | OUTPATIENT
Start: 2024-06-17 | End: 2024-07-17

## 2024-06-17 RX ORDER — LISDEXAMFETAMINE DIMESYLATE 40 MG/1
40 CAPSULE ORAL EVERY MORNING
Qty: 30 CAPSULE | Refills: 0 | Status: SHIPPED | OUTPATIENT
Start: 2024-06-17 | End: 2024-07-17

## 2024-06-21 RX ORDER — LISINOPRIL 10 MG/1
TABLET ORAL
Qty: 90 TABLET | Refills: 3 | Status: SHIPPED | OUTPATIENT
Start: 2024-06-21

## 2024-07-05 ENCOUNTER — LAB (OUTPATIENT)
Dept: LAB | Facility: HOSPITAL | Age: 69
End: 2024-07-05
Payer: COMMERCIAL

## 2024-07-05 DIAGNOSIS — R97.20 ELEVATED PSA: ICD-10-CM

## 2024-07-05 DIAGNOSIS — N40.1 BENIGN PROSTATIC HYPERPLASIA WITH LOWER URINARY TRACT SYMPTOMS, SYMPTOM DETAILS UNSPECIFIED: ICD-10-CM

## 2024-07-05 LAB — PSA SERPL-MCNC: 13.8 NG/ML (ref 0–4)

## 2024-07-05 PROCEDURE — 36415 COLL VENOUS BLD VENIPUNCTURE: CPT

## 2024-07-05 PROCEDURE — 84153 ASSAY OF PSA TOTAL: CPT

## 2024-07-10 NOTE — PROGRESS NOTES
Chief Complaint: Urologic complaint    Subjective         History of Present Illness  Alec Dunlap is a 68 y.o. male       Elevated PSA  BPH        Flomax 0.4 mg daily - helped him void better but could not tolerate secondary to being lightheaded/dizzy    Did Bactrim DS p.o. twice daily x 2 weeks.    Voiding okay. Weak at times  No prostate meds.        No GH    No prostatitis symptoms.    No history of prostatitis      PVR       032     088       0.8,   GFR  93         No cardiopulmonary history.  Non-smoker.      Not worried about ED      Previous    1 kidney stone many years ago    Father with prostate cancer,   no history of urologic surgery.    Strong family history of CAD          PSA          13.8   PSAd   0.14        13.8      6.2     10/23   10.7    2022 MRI -97 g   -  Negative, prostatitis, thickened bladder wall.  PSAd  0.07     6.9     7.3   6.0     4.2                  Objective     Past Medical History:   Diagnosis Date    ADHD (attention deficit hyperactivity disorder) 3/01/2021    Colon polyp last visit    Erectile dysfunction 2019    Hypertension     Infectious viral hepatitis i was about 10 years old    TIA (transient ischemic attack)              Social History     Socioeconomic History    Marital status:    Tobacco Use    Smoking status: Former     Current packs/day: 0.00     Average packs/day: 2.0 packs/day for 7.0 years (14.0 ttl pk-yrs)     Types: Cigarettes     Start date: 1973     Quit date: 1979     Years since quittin.5     Passive exposure: Never    Smokeless tobacco: Never   Vaping Use    Vaping status: Never Used   Substance and Sexual Activity    Alcohol use: Not Currently     Alcohol/week: 6.0 standard drinks of alcohol     Types: 6 Glasses of wine per week    Drug use: Never    Sexual activity: Not Currently     Partners: Female       Vital Signs:   There were no vitals taken for this visit.                    Assessment and Plan    Diagnoses and all orders for this visit:    1. Benign prostatic hyperplasia with lower urinary tract symptoms, symptom details unspecified (Primary)    2. Elevated PSA        BPH      Cannot tolerate Flomax, will start off on further medication at this time.  Because the size of his prostate we did briefly discuss starting finasteride in the future as we workup his elevated PSA          Elevated PSA      PSA did not come down with antibiotics.  We discussed this today I will go ahead and get MRI prostate to rule out underlying prostatic malignancy.  Patient voiced understanding      Follow-up after MRI

## 2024-07-12 ENCOUNTER — OFFICE VISIT (OUTPATIENT)
Dept: UROLOGY | Facility: CLINIC | Age: 69
End: 2024-07-12
Payer: COMMERCIAL

## 2024-07-12 VITALS — RESPIRATION RATE: 16 BRPM | WEIGHT: 214 LBS | BODY MASS INDEX: 30.64 KG/M2 | HEIGHT: 70 IN

## 2024-07-12 DIAGNOSIS — R97.20 ELEVATED PSA: ICD-10-CM

## 2024-07-12 DIAGNOSIS — N40.1 BENIGN PROSTATIC HYPERPLASIA WITH LOWER URINARY TRACT SYMPTOMS, SYMPTOM DETAILS UNSPECIFIED: Primary | ICD-10-CM

## 2024-07-18 ENCOUNTER — TELEPHONE (OUTPATIENT)
Dept: SURGERY | Facility: CLINIC | Age: 69
End: 2024-07-18
Payer: COMMERCIAL

## 2024-07-18 DIAGNOSIS — F90.2 ATTENTION DEFICIT HYPERACTIVITY DISORDER (ADHD), COMBINED TYPE: ICD-10-CM

## 2024-07-18 RX ORDER — DEXTROAMPHETAMINE SACCHARATE, AMPHETAMINE ASPARTATE, DEXTROAMPHETAMINE SULFATE AND AMPHETAMINE SULFATE 3.75; 3.75; 3.75; 3.75 MG/1; MG/1; MG/1; MG/1
15 TABLET ORAL DAILY
Qty: 30 TABLET | Refills: 0 | Status: SHIPPED | OUTPATIENT
Start: 2024-07-18 | End: 2024-08-17

## 2024-07-18 RX ORDER — LISDEXAMFETAMINE DIMESYLATE 40 MG/1
40 CAPSULE ORAL EVERY MORNING
Qty: 30 CAPSULE | Refills: 0 | Status: SHIPPED | OUTPATIENT
Start: 2024-07-18 | End: 2024-08-17

## 2024-07-18 NOTE — TELEPHONE ENCOUNTER
PATIENT CALLED AND SAID SOMEONE WAS SUPPOSED TO SCHEDULE A PROSTATE MRI FOR HIM AT [facility], BUT NO ONE HAS CONTACTED HIM.    #[phone]

## 2024-07-18 NOTE — TELEPHONE ENCOUNTER
Informed patient that MRI is scheduled [facility]15/[facility] at [facility] Pt verbalized understanding of information.

## 2024-08-16 DIAGNOSIS — F90.2 ATTENTION DEFICIT HYPERACTIVITY DISORDER (ADHD), COMBINED TYPE: ICD-10-CM

## 2024-08-16 RX ORDER — LISDEXAMFETAMINE DIMESYLATE 40 MG/1
40 CAPSULE ORAL EVERY MORNING
Qty: 30 CAPSULE | Refills: 0 | Status: SHIPPED | OUTPATIENT
Start: 2024-08-16 | End: 2024-09-15

## 2024-08-16 RX ORDER — DEXTROAMPHETAMINE SACCHARATE, AMPHETAMINE ASPARTATE, DEXTROAMPHETAMINE SULFATE AND AMPHETAMINE SULFATE 3.75; 3.75; 3.75; 3.75 MG/1; MG/1; MG/1; MG/1
15 TABLET ORAL DAILY
Qty: 30 TABLET | Refills: 0 | Status: SHIPPED | OUTPATIENT
Start: 2024-08-16 | End: 2024-09-15

## 2024-08-20 NOTE — PROGRESS NOTES
Chief Complaint: Urologic complaint    Subjective         History of Present Illness  Alec Dunlap is a 68 y.o. male         Elevated PSA  BPH  Right varicocele      Follows up today with MRI prostate.        Voiding okay. Weak at times  No prostate meds.          PVR       032     088        No cardiopulmonary history.  Non-smoker.        Previous    Flomax 0.4 mg daily - helped him void better but could not tolerate secondary to being lightheaded/dizzy    No prostatitis symptoms.    No history of prostatitis         0.8,   GFR  93           Not worried about ED      Previous    1 kidney stone many years ago    Father with prostate cancer,   no history of urologic surgery.    Strong family history of CAD          PSA    8/15/2024 MRI prostate - 97 g, PSAd 0.14 -negative,   thick-walled urinary bladder trabeculated.  Small fat-containing left inguinal hernia.  Right sided varicocele.          13.8   PSAd   0.14        13.8      6.2     10/23   10.7    2022 MRI -97 g   -  Negative, prostatitis, thickened bladder wall.  PSAd  0.07     6.9     7.3   6.0     4.2                  Objective     Past Medical History:   Diagnosis Date    ADHD (attention deficit hyperactivity disorder) 3/01/2021    Colon polyp last visit    Erectile dysfunction 2019    Hypertension     Infectious viral hepatitis i was about 10 years old    TIA (transient ischemic attack)              Social History     Socioeconomic History    Marital status:    Tobacco Use    Smoking status: Former     Current packs/day: 0.00     Average packs/day: 2.0 packs/day for 7.0 years (14.0 ttl pk-yrs)     Types: Cigarettes     Start date: 1973     Quit date: 1979     Years since quittin.6     Passive exposure: Never    Smokeless tobacco: Never   Vaping Use    Vaping status: Never Used   Substance and Sexual Activity    Alcohol use: Not Currently     Alcohol/week: 6.0 standard drinks  of alcohol     Types: 6 Glasses of wine per week    Drug use: Never    Sexual activity: Not Currently     Partners: Female       Vital Signs:   There were no vitals taken for this visit.                   Assessment and Plan    Diagnoses and all orders for this visit:    1. Benign prostatic hyperplasia with lower urinary tract symptoms, symptom details unspecified (Primary)    2. Elevated PSA          BPH      Patient with a very large prostate and some signs of significant BPH on MRI.  We reviewed this.  I do think we should place him on finasteride 5 mg daily.  Risk and benefits discussed.  Patient is in agreement          Right sided varicocele    To rule out intra-abdominal pathology we will get a renal ultrasound to rule out a right renal mass.  Patient voiced understanding      Follow-up in 1 year with PSA      PVR follow-up

## 2024-08-23 ENCOUNTER — OFFICE VISIT (OUTPATIENT)
Dept: UROLOGY | Facility: CLINIC | Age: 69
End: 2024-08-23
Payer: COMMERCIAL

## 2024-08-23 DIAGNOSIS — N40.1 BENIGN PROSTATIC HYPERPLASIA WITH LOWER URINARY TRACT SYMPTOMS, SYMPTOM DETAILS UNSPECIFIED: Primary | ICD-10-CM

## 2024-08-23 DIAGNOSIS — R97.20 ELEVATED PSA: ICD-10-CM

## 2024-08-23 DIAGNOSIS — I86.1 VARICOCELE: ICD-10-CM

## 2024-08-23 RX ORDER — FINASTERIDE 5 MG/1
5 TABLET, FILM COATED ORAL DAILY
Qty: 90 TABLET | Refills: 4 | Status: SHIPPED | OUTPATIENT
Start: 2024-08-23

## 2024-09-17 DIAGNOSIS — F90.2 ATTENTION DEFICIT HYPERACTIVITY DISORDER (ADHD), COMBINED TYPE: ICD-10-CM

## 2024-09-17 RX ORDER — DEXTROAMPHETAMINE SACCHARATE, AMPHETAMINE ASPARTATE, DEXTROAMPHETAMINE SULFATE AND AMPHETAMINE SULFATE 3.75; 3.75; 3.75; 3.75 MG/1; MG/1; MG/1; MG/1
15 TABLET ORAL DAILY
Qty: 30 TABLET | Refills: 0 | Status: SHIPPED | OUTPATIENT
Start: 2024-09-17 | End: 2024-10-17

## 2024-09-17 RX ORDER — LISDEXAMFETAMINE DIMESYLATE 40 MG/1
40 CAPSULE ORAL EVERY MORNING
Qty: 30 CAPSULE | Refills: 0 | Status: SHIPPED | OUTPATIENT
Start: 2024-09-17 | End: 2024-10-17

## 2024-09-18 ENCOUNTER — TELEPHONE (OUTPATIENT)
Dept: CARDIOLOGY | Facility: CLINIC | Age: 69
End: 2024-09-18
Payer: COMMERCIAL

## 2024-09-19 ENCOUNTER — OFFICE VISIT (OUTPATIENT)
Dept: CARDIOLOGY | Facility: CLINIC | Age: 69
End: 2024-09-19
Payer: COMMERCIAL

## 2024-09-19 ENCOUNTER — TELEPHONE (OUTPATIENT)
Dept: INTERNAL MEDICINE | Age: 69
End: 2024-09-19
Payer: COMMERCIAL

## 2024-09-19 VITALS
HEIGHT: 70 IN | SYSTOLIC BLOOD PRESSURE: 135 MMHG | DIASTOLIC BLOOD PRESSURE: 84 MMHG | HEART RATE: 77 BPM | BODY MASS INDEX: 31.35 KG/M2 | WEIGHT: 219 LBS

## 2024-09-19 DIAGNOSIS — Z01.810 PREOPERATIVE CARDIOVASCULAR EXAMINATION: ICD-10-CM

## 2024-09-19 DIAGNOSIS — I10 HYPERTENSION, ESSENTIAL: ICD-10-CM

## 2024-09-19 DIAGNOSIS — R07.9 CHEST PAIN, UNSPECIFIED TYPE: Primary | ICD-10-CM

## 2024-09-19 DIAGNOSIS — R73.01 IFG (IMPAIRED FASTING GLUCOSE): Primary | ICD-10-CM

## 2024-09-20 ENCOUNTER — HOSPITAL ENCOUNTER (OUTPATIENT)
Dept: ULTRASOUND IMAGING | Facility: HOSPITAL | Age: 69
Discharge: HOME | End: 2024-09-20
Admitting: UROLOGY
Payer: COMMERCIAL

## 2024-09-20 DIAGNOSIS — I86.1 VARICOCELE: ICD-10-CM

## 2024-09-20 PROCEDURE — 76775 US EXAM ABDO BACK WALL LIM: CPT

## 2024-09-23 RX ORDER — TRIAMCINOLONE ACETONIDE 1 MG/G
1 CREAM TOPICAL 2 TIMES DAILY
Qty: 45 G | Refills: 2 | Status: SHIPPED | OUTPATIENT
Start: 2024-09-23

## 2024-09-24 ENCOUNTER — HOSPITAL ENCOUNTER (OUTPATIENT)
Dept: CARDIOLOGY | Facility: HOSPITAL | Age: 69
Discharge: HOME | End: 2024-09-24
Admitting: INTERNAL MEDICINE
Payer: COMMERCIAL

## 2024-09-24 ENCOUNTER — HOSPITAL ENCOUNTER (OUTPATIENT)
Dept: NUCLEAR MEDICINE | Facility: HOSPITAL | Age: 69
Discharge: HOME | End: 2024-09-24
Payer: COMMERCIAL

## 2024-09-24 DIAGNOSIS — R07.9 CHEST PAIN, UNSPECIFIED TYPE: ICD-10-CM

## 2024-09-24 LAB
AORTIC DIMENSIONLESS INDEX: 0.82 (DI)
ASCENDING AORTA: 3.1 CM
BH CV ECHO MEAS - AO MAX PG: 5 MMHG
BH CV ECHO MEAS - AO MEAN PG: 3 MMHG
BH CV ECHO MEAS - AO V2 MAX: 112 CM/SEC
BH CV ECHO MEAS - AO V2 VTI: 22.9 CM
BH CV ECHO MEAS - AVA(I,D): 2.6 CM2
BH CV ECHO MEAS - EDV(CUBED): 68.9 ML
BH CV ECHO MEAS - EDV(MOD-SP2): 92.7 ML
BH CV ECHO MEAS - EDV(MOD-SP4): 98.5 ML
BH CV ECHO MEAS - EF(MOD-BP): 60.7 %
BH CV ECHO MEAS - EF(MOD-SP2): 57.5 %
BH CV ECHO MEAS - EF(MOD-SP4): 61 %
BH CV ECHO MEAS - ESV(CUBED): 12.2 ML
BH CV ECHO MEAS - ESV(MOD-SP2): 39.4 ML
BH CV ECHO MEAS - ESV(MOD-SP4): 38.4 ML
BH CV ECHO MEAS - FS: 43.9 %
BH CV ECHO MEAS - IVS/LVPW: 0.89 CM
BH CV ECHO MEAS - IVSD: 0.8 CM
BH CV ECHO MEAS - LA DIMENSION: 3.7 CM
BH CV ECHO MEAS - LAT PEAK E' VEL: 11.6 CM/SEC
BH CV ECHO MEAS - LV DIASTOLIC VOL/BSA (35-75): 45.4 CM2
BH CV ECHO MEAS - LV MASS(C)D: 105.6 GRAMS
BH CV ECHO MEAS - LV MAX PG: 3.3 MMHG
BH CV ECHO MEAS - LV MEAN PG: 2 MMHG
BH CV ECHO MEAS - LV SYSTOLIC VOL/BSA (12-30): 17.7 CM2
BH CV ECHO MEAS - LV V1 MAX: 91.2 CM/SEC
BH CV ECHO MEAS - LV V1 VTI: 18.8 CM
BH CV ECHO MEAS - LVIDD: 4.1 CM
BH CV ECHO MEAS - LVIDS: 2.3 CM
BH CV ECHO MEAS - LVOT AREA: 3.1 CM2
BH CV ECHO MEAS - LVOT DIAM: 2 CM
BH CV ECHO MEAS - LVPWD: 0.9 CM
BH CV ECHO MEAS - MED PEAK E' VEL: 9.3 CM/SEC
BH CV ECHO MEAS - MV A MAX VEL: 81.8 CM/SEC
BH CV ECHO MEAS - MV DEC TIME: 0.21 SEC
BH CV ECHO MEAS - MV E MAX VEL: 65.5 CM/SEC
BH CV ECHO MEAS - MV E/A: 0.8
BH CV ECHO MEAS - RVDD: 3.6 CM
BH CV ECHO MEAS - SV(LVOT): 59.1 ML
BH CV ECHO MEAS - SV(MOD-SP2): 53.3 ML
BH CV ECHO MEAS - SV(MOD-SP4): 60.1 ML
BH CV ECHO MEAS - SVI(LVOT): 27.2 ML/M2
BH CV ECHO MEAS - SVI(MOD-SP2): 24.6 ML/M2
BH CV ECHO MEAS - SVI(MOD-SP4): 27.7 ML/M2
BH CV ECHO MEAS - TAPSE (>1.6): 1.8 CM
BH CV ECHO MEASUREMENTS AVERAGE E/E' RATIO: 6.27
BH CV IMMEDIATE POST TECH DATA BLOOD PRESSURE: NORMAL MMHG
BH CV IMMEDIATE POST TECH DATA HEART RATE: 128 BPM
BH CV IMMEDIATE POST TECH DATA OXYGEN SATS: 96 %
BH CV REST NUCLEAR ISOTOPE DOSE: 8.8 MCI
BH CV SIX MINUTE RECOVERY TECH DATA BLOOD PRESSURE: NORMAL
BH CV SIX MINUTE RECOVERY TECH DATA HEART RATE: 83 BPM
BH CV SIX MINUTE RECOVERY TECH DATA OXYGEN SATURATION: 98 %
BH CV STRESS BP STAGE 1: NORMAL
BH CV STRESS BP STAGE 2: NORMAL
BH CV STRESS BP STAGE 3: NORMAL
BH CV STRESS DURATION MIN STAGE 1: 3
BH CV STRESS DURATION MIN STAGE 2: 3
BH CV STRESS DURATION MIN STAGE 3: 3
BH CV STRESS DURATION SEC STAGE 1: 0
BH CV STRESS DURATION SEC STAGE 2: 0
BH CV STRESS DURATION SEC STAGE 3: 0
BH CV STRESS GRADE STAGE 1: 10
BH CV STRESS GRADE STAGE 2: 12
BH CV STRESS GRADE STAGE 3: 14
BH CV STRESS HR STAGE 1: 102
BH CV STRESS HR STAGE 2: 111
BH CV STRESS HR STAGE 3: 132
BH CV STRESS METS STAGE 1: 5
BH CV STRESS METS STAGE 2: 7.5
BH CV STRESS METS STAGE 3: 10
BH CV STRESS NUCLEAR ISOTOPE DOSE: 34.4 MCI
BH CV STRESS O2 STAGE 1: 97
BH CV STRESS O2 STAGE 2: 99
BH CV STRESS O2 STAGE 3: 96
BH CV STRESS PROTOCOL 1: NORMAL
BH CV STRESS RECOVERY BP: NORMAL MMHG
BH CV STRESS RECOVERY HR: 83 BPM
BH CV STRESS RECOVERY O2: 98 %
BH CV STRESS SPEED STAGE 1: 1.7
BH CV STRESS SPEED STAGE 2: 2.5
BH CV STRESS SPEED STAGE 3: 3.4
BH CV STRESS STAGE 1: 1
BH CV STRESS STAGE 2: 2
BH CV STRESS STAGE 3: 3
BH CV THREE MINUTE POST TECH DATA BLOOD PRESSURE: NORMAL MMHG
BH CV THREE MINUTE POST TECH DATA HEART RATE: 92 BPM
BH CV THREE MINUTE POST TECH DATA OXYGEN SATURATION: 99 %
BH CV XLRA - TDI S': 13.6 CM/SEC
IVRT: 87 MS
LEFT ATRIUM VOLUME INDEX: 16.5 ML/M2
LV EF NUC BP: 59 %
MAXIMAL PREDICTED HEART RATE: 152 BPM
PERCENT MAX PREDICTED HR: 86.84 %
SINUS: 3 CM
STRESS BASELINE BP: NORMAL MMHG
STRESS BASELINE HR: 65 BPM
STRESS O2 SAT REST: 95 %
STRESS PERCENT HR: 102 %
STRESS POST ESTIMATED WORKLOAD: 10.2 METS
STRESS POST EXERCISE DUR MIN: 9 MIN
STRESS POST EXERCISE DUR SEC: 0 SEC
STRESS POST O2 SAT PEAK: 99 %
STRESS POST PEAK BP: NORMAL MMHG
STRESS POST PEAK HR: 132 BPM
STRESS TARGET HR: 129 BPM

## 2024-09-24 PROCEDURE — 78452 HT MUSCLE IMAGE SPECT MULT: CPT

## 2024-09-24 PROCEDURE — A9502 TC99M TETROFOSMIN: HCPCS | Performed by: INTERNAL MEDICINE

## 2024-09-24 PROCEDURE — 93306 TTE W/DOPPLER COMPLETE: CPT

## 2024-09-24 PROCEDURE — 93017 CV STRESS TEST TRACING ONLY: CPT

## 2024-09-24 PROCEDURE — 93306 TTE W/DOPPLER COMPLETE: CPT | Performed by: INTERNAL MEDICINE

## 2024-09-24 PROCEDURE — 93016 CV STRESS TEST SUPVJ ONLY: CPT | Performed by: NURSE PRACTITIONER

## 2024-09-24 PROCEDURE — 78452 HT MUSCLE IMAGE SPECT MULT: CPT | Performed by: INTERNAL MEDICINE

## 2024-09-24 PROCEDURE — 0 TECHNETIUM TETROFOSMIN KIT: Performed by: INTERNAL MEDICINE

## 2024-09-24 PROCEDURE — 93018 CV STRESS TEST I&R ONLY: CPT | Performed by: INTERNAL MEDICINE

## 2024-09-24 RX ADMIN — TETROFOSMIN 1 DOSE: 1.38 INJECTION, POWDER, LYOPHILIZED, FOR SOLUTION INTRAVENOUS at 12:08

## 2024-09-24 RX ADMIN — TETROFOSMIN 1 DOSE: 1.38 INJECTION, POWDER, LYOPHILIZED, FOR SOLUTION INTRAVENOUS at 10:45

## 2024-09-25 ENCOUNTER — TELEPHONE (OUTPATIENT)
Dept: CARDIOLOGY | Facility: CLINIC | Age: 69
End: 2024-09-25
Payer: COMMERCIAL

## 2024-10-14 ENCOUNTER — LAB (OUTPATIENT)
Dept: LAB | Facility: HOSPITAL | Age: 69
End: 2024-10-14
Payer: COMMERCIAL

## 2024-10-14 DIAGNOSIS — G45.9 TIA (TRANSIENT ISCHEMIC ATTACK): ICD-10-CM

## 2024-10-14 DIAGNOSIS — B00.1 COLD SORE: ICD-10-CM

## 2024-10-14 DIAGNOSIS — I10 HYPERTENSION, ESSENTIAL: ICD-10-CM

## 2024-10-14 DIAGNOSIS — R97.20 ELEVATED PSA: ICD-10-CM

## 2024-10-14 DIAGNOSIS — F90.2 ATTENTION DEFICIT HYPERACTIVITY DISORDER (ADHD), COMBINED TYPE: ICD-10-CM

## 2024-10-14 DIAGNOSIS — R19.5 POSITIVE COLORECTAL CANCER SCREENING USING COLOGUARD TEST: ICD-10-CM

## 2024-10-14 DIAGNOSIS — Z00.00 PHYSICAL EXAM, ANNUAL: ICD-10-CM

## 2024-10-14 DIAGNOSIS — N40.1 BENIGN PROSTATIC HYPERPLASIA WITH LOWER URINARY TRACT SYMPTOMS, SYMPTOM DETAILS UNSPECIFIED: ICD-10-CM

## 2024-10-14 DIAGNOSIS — R73.01 IFG (IMPAIRED FASTING GLUCOSE): ICD-10-CM

## 2024-10-14 DIAGNOSIS — Z12.5 SCREENING PSA (PROSTATE SPECIFIC ANTIGEN): ICD-10-CM

## 2024-10-14 LAB
ALBUMIN SERPL-MCNC: 4 G/DL (ref 3.5–5.2)
ALBUMIN/GLOB SERPL: 1.4 G/DL
ALP SERPL-CCNC: 79 U/L (ref 39–117)
ALT SERPL W P-5'-P-CCNC: 22 U/L (ref 1–41)
ANION GAP SERPL CALCULATED.3IONS-SCNC: 10.6 MMOL/L (ref 5–15)
AST SERPL-CCNC: 20 U/L (ref 1–40)
BASOPHILS # BLD AUTO: 0.07 10*3/MM3 (ref 0–0.2)
BASOPHILS NFR BLD AUTO: 0.9 % (ref 0–1.5)
BILIRUB SERPL-MCNC: 0.7 MG/DL (ref 0–1.2)
BUN SERPL-MCNC: 13 MG/DL (ref 8–23)
BUN/CREAT SERPL: 11.9 (ref 7–25)
CALCIUM SPEC-SCNC: 9.4 MG/DL (ref 8.6–10.5)
CHLORIDE SERPL-SCNC: 104 MMOL/L (ref 98–107)
CHOLEST SERPL-MCNC: 143 MG/DL (ref 0–200)
CO2 SERPL-SCNC: 24.4 MMOL/L (ref 22–29)
CREAT SERPL-MCNC: 1.09 MG/DL (ref 0.76–1.27)
DEPRECATED RDW RBC AUTO: 44.7 FL (ref 37–54)
EGFRCR SERPLBLD CKD-EPI 2021: 73.5 ML/MIN/1.73
EOSINOPHIL # BLD AUTO: 0.49 10*3/MM3 (ref 0–0.4)
EOSINOPHIL NFR BLD AUTO: 6.6 % (ref 0.3–6.2)
ERYTHROCYTE [DISTWIDTH] IN BLOOD BY AUTOMATED COUNT: 13 % (ref 12.3–15.4)
GLOBULIN UR ELPH-MCNC: 2.9 GM/DL
GLUCOSE SERPL-MCNC: 107 MG/DL (ref 65–99)
HBA1C MFR BLD: 5.5 % (ref 4.8–5.6)
HCT VFR BLD AUTO: 42.4 % (ref 37.5–51)
HDLC SERPL-MCNC: 50 MG/DL (ref 40–60)
HGB BLD-MCNC: 13.3 G/DL (ref 13–17.7)
IMM GRANULOCYTES # BLD AUTO: 0.07 10*3/MM3 (ref 0–0.05)
IMM GRANULOCYTES NFR BLD AUTO: 0.9 % (ref 0–0.5)
LDLC SERPL CALC-MCNC: 79 MG/DL (ref 0–100)
LDLC/HDLC SERPL: 1.59 {RATIO}
LYMPHOCYTES # BLD AUTO: 2.56 10*3/MM3 (ref 0.7–3.1)
LYMPHOCYTES NFR BLD AUTO: 34.4 % (ref 19.6–45.3)
MCH RBC QN AUTO: 29.7 PG (ref 26.6–33)
MCHC RBC AUTO-ENTMCNC: 31.4 G/DL (ref 31.5–35.7)
MCV RBC AUTO: 94.6 FL (ref 79–97)
MONOCYTES # BLD AUTO: 1 10*3/MM3 (ref 0.1–0.9)
MONOCYTES NFR BLD AUTO: 13.4 % (ref 5–12)
NEUTROPHILS NFR BLD AUTO: 3.26 10*3/MM3 (ref 1.7–7)
NEUTROPHILS NFR BLD AUTO: 43.8 % (ref 42.7–76)
NRBC BLD AUTO-RTO: 0 /100 WBC (ref 0–0.2)
PLATELET # BLD AUTO: 299 10*3/MM3 (ref 140–450)
PMV BLD AUTO: 11.5 FL (ref 6–12)
POTASSIUM SERPL-SCNC: 4.2 MMOL/L (ref 3.5–5.2)
PROT SERPL-MCNC: 6.9 G/DL (ref 6–8.5)
PSA SERPL-MCNC: 7.88 NG/ML (ref 0–4)
RBC # BLD AUTO: 4.48 10*6/MM3 (ref 4.14–5.8)
SODIUM SERPL-SCNC: 139 MMOL/L (ref 136–145)
TRIGL SERPL-MCNC: 67 MG/DL (ref 0–150)
VLDLC SERPL-MCNC: 14 MG/DL (ref 5–40)
WBC NRBC COR # BLD AUTO: 7.45 10*3/MM3 (ref 3.4–10.8)

## 2024-10-14 PROCEDURE — 83036 HEMOGLOBIN GLYCOSYLATED A1C: CPT

## 2024-10-14 PROCEDURE — 85025 COMPLETE CBC W/AUTO DIFF WBC: CPT

## 2024-10-14 PROCEDURE — 36415 COLL VENOUS BLD VENIPUNCTURE: CPT

## 2024-10-14 PROCEDURE — 80053 COMPREHEN METABOLIC PANEL: CPT

## 2024-10-14 PROCEDURE — 80061 LIPID PANEL: CPT

## 2024-10-14 PROCEDURE — 84153 ASSAY OF PSA TOTAL: CPT

## 2024-10-16 DIAGNOSIS — F90.2 ATTENTION DEFICIT HYPERACTIVITY DISORDER (ADHD), COMBINED TYPE: ICD-10-CM

## 2024-10-16 RX ORDER — DEXTROAMPHETAMINE SACCHARATE, AMPHETAMINE ASPARTATE, DEXTROAMPHETAMINE SULFATE AND AMPHETAMINE SULFATE 3.75; 3.75; 3.75; 3.75 MG/1; MG/1; MG/1; MG/1
15 TABLET ORAL DAILY
Qty: 30 TABLET | Refills: 0 | Status: SHIPPED | OUTPATIENT
Start: 2024-10-16 | End: 2024-11-15

## 2024-10-16 RX ORDER — LISDEXAMFETAMINE DIMESYLATE 40 MG/1
40 CAPSULE ORAL EVERY MORNING
Qty: 30 CAPSULE | Refills: 0 | Status: SHIPPED | OUTPATIENT
Start: 2024-10-16 | End: 2024-11-15

## 2024-10-17 ENCOUNTER — TELEPHONE (OUTPATIENT)
Dept: UROLOGY | Facility: CLINIC | Age: 69
End: 2024-10-17
Payer: COMMERCIAL

## 2024-10-17 NOTE — TELEPHONE ENCOUNTER
Patient came into office asking for long term disability. Per dr amin, this will have to be done through   PCP. Patient stated his concerns. Are if he has a procedure, if he can use long term as he is almost up with his short term after his shoulder surgery. Patient stated he will try to request a sooner appt via my chart after he thinks about it.

## 2024-10-18 ENCOUNTER — OFFICE VISIT (OUTPATIENT)
Dept: INTERNAL MEDICINE | Age: 69
End: 2024-10-18
Payer: COMMERCIAL

## 2024-10-18 VITALS
SYSTOLIC BLOOD PRESSURE: 119 MMHG | WEIGHT: 212 LBS | BODY MASS INDEX: 30.35 KG/M2 | HEIGHT: 70 IN | HEART RATE: 78 BPM | DIASTOLIC BLOOD PRESSURE: 72 MMHG | TEMPERATURE: 98.2 F | OXYGEN SATURATION: 98 %

## 2024-10-18 DIAGNOSIS — R97.20 ELEVATED PSA: ICD-10-CM

## 2024-10-18 DIAGNOSIS — N40.1 BENIGN PROSTATIC HYPERPLASIA WITH LOWER URINARY TRACT SYMPTOMS, SYMPTOM DETAILS UNSPECIFIED: ICD-10-CM

## 2024-10-18 DIAGNOSIS — I63.9 CEREBROVASCULAR ACCIDENT (CVA), UNSPECIFIED MECHANISM: ICD-10-CM

## 2024-10-18 DIAGNOSIS — I10 HYPERTENSION, ESSENTIAL: ICD-10-CM

## 2024-10-18 DIAGNOSIS — F90.2 ATTENTION DEFICIT HYPERACTIVITY DISORDER (ADHD), COMBINED TYPE: Primary | ICD-10-CM

## 2024-10-18 DIAGNOSIS — R73.01 IFG (IMPAIRED FASTING GLUCOSE): ICD-10-CM

## 2024-10-18 DIAGNOSIS — G45.9 TIA (TRANSIENT ISCHEMIC ATTACK): ICD-10-CM

## 2024-10-18 PROCEDURE — 99214 OFFICE O/P EST MOD 30 MIN: CPT | Performed by: INTERNAL MEDICINE

## 2024-10-18 RX ORDER — ROSUVASTATIN CALCIUM 10 MG/1
10 TABLET, COATED ORAL DAILY
Qty: 90 TABLET | Refills: 3 | Status: SHIPPED | OUTPATIENT
Start: 2024-10-18

## 2024-10-18 NOTE — PROGRESS NOTES
"CHIEF COMPLAINT/ HPI:  Hypertension (Routine follow up, Lab follow up. )    Patient is here to follow-up with his ADHD, he still taking the medications he is doing okay he is currently off work due to recent shoulder surgeries can have some upcoming potentially more surgeries at some point, he is working with physical therapy, laser treatments, he is also concerned about low blood sugars she has had some spells where he is felt weak, arms felt tingly heavy at times, blood sugars in the past remotely have been low, currently not taking any diabetic medications here to review labs,          Objective   Vital Signs  Vitals:    10/18/24 0950   BP: 119/72   BP Location: Left arm   Patient Position: Sitting   Pulse: 78   Temp: 98.2 °F (36.8 °C)   SpO2: 98%   Weight: 96.2 kg (212 lb)   Height: 177.8 cm (70\")      Body mass index is 30.42 kg/m².  Review of Systems   Constitutional: Negative.    HENT: Negative.     Eyes: Negative.    Respiratory: Negative.     Cardiovascular: Negative.    Gastrointestinal: Negative.    Endocrine: Negative.    Genitourinary: Negative.    Musculoskeletal:  Positive for arthralgias and joint swelling (Right shoulder discomfort, incisions, and immobilization).   Allergic/Immunologic: Negative.    Neurological: Negative.    Hematological: Negative.    Psychiatric/Behavioral: Negative.        Physical Exam  Constitutional:       General: He is not in acute distress.     Appearance: Normal appearance.   HENT:      Head: Normocephalic.   Cardiovascular:      Rate and Rhythm: Normal rate and regular rhythm.      Pulses: Normal pulses.      Heart sounds: Normal heart sounds.   Pulmonary:      Effort: Pulmonary effort is normal.      Breath sounds: Normal breath sounds.   Musculoskeletal:         General: No swelling (Patient has incision sites on the right anterior shoulder healing, no open areas no redness no drainage, right shoulder is immobilized,). Normal range of motion.   Skin:     General: Skin " "is warm and dry.      Coloration: Skin is not jaundiced.   Neurological:      General: No focal deficit present.      Mental Status: He is alert and oriented to person, place, and time. Mental status is at baseline.   Psychiatric:         Mood and Affect: Mood normal.         Behavior: Behavior normal.         Thought Content: Thought content normal.         Judgment: Judgment normal.        Result Review :   No results found for: \"PROBNP\", \"BNP\"  CMP          4/5/2024    09:41 10/14/2024    09:43   CMP   Glucose 95  107    BUN 9  13    Creatinine 0.97  1.09    EGFR 85.0  73.5    Sodium 140  139    Potassium 4.7  4.2    Chloride 104  104    Calcium 8.9  9.4    Total Protein 7.2  6.9    Albumin 4.4  4.0    Globulin 2.8  2.9    Total Bilirubin 1.0  0.7    Alkaline Phosphatase 68  79    AST (SGOT) 21  20    ALT (SGPT) 15  22    Albumin/Globulin Ratio 1.6  1.4    BUN/Creatinine Ratio 9.3  11.9    Anion Gap 10.4  10.6      CBC w/diff          4/5/2024    09:41 9/23/2024    13:49 10/14/2024    09:43   CBC w/Diff   WBC 6.25  11.20     7.45    RBC 4.67  4.86     4.48    Hemoglobin 13.9  14.7     13.3    Hematocrit 42.3  44.7     42.4    MCV 90.6  92.0     94.6    MCH 29.8  30.2     29.7    MCHC 32.9  32.9     31.4    RDW 13.1  13.8     13.0    Platelets 266  261     299    Neutrophil Rel % 51.1  68.0     43.8    Immature Granulocyte Rel % 0.6  0.5     0.9    Lymphocyte Rel % 30.2  20.3     34.4    Monocyte Rel % 11.2  8.1     13.4    Eosinophil Rel % 5.9  2.6     6.6    Basophil Rel % 1.0  0.5     0.9       Details          This result is from an external source.              Lipid Panel          4/5/2024    09:41 10/14/2024    09:43   Lipid Panel   Total Cholesterol 136  143    Triglycerides 30  67    HDL Cholesterol 68  50    VLDL Cholesterol 8  14    LDL Cholesterol  60  79    LDL/HDL Ratio 0.91  1.59       Lab Results   Component Value Date    TSH 1.350 09/26/2022    TSH 1.100 01/17/2019      Lab Results   Component " Value Date    FREET4 0.97 09/26/2022    FREET4 1.1 01/17/2019      A1C Last 3 Results          4/5/2024    09:41 10/14/2024    09:43   HGBA1C Last 3 Results   Hemoglobin A1C 5.80  5.50       PSA          4/5/2024    09:41 7/5/2024    06:51 10/14/2024    09:43   PSA   PSA 13.800  13.800  7.880                     Visit Diagnoses:    ICD-10-CM ICD-9-CM   1. Attention deficit hyperactivity disorder (ADHD), combined type  F90.2 314.01   2. Cerebrovascular accident (CVA), unspecified mechanism  I63.9 434.91   3. TIA (transient ischemic attack)  G45.9 435.9   4. Elevated PSA  R97.20 790.93   5. Benign prostatic hyperplasia with lower urinary tract symptoms, symptom details unspecified  N40.1 600.01   6. IFG (impaired fasting glucose)  R73.01 790.21   7. Hypertension, essential  I10 401.9       Assessment and Plan   Diagnoses and all orders for this visit:    1. Attention deficit hyperactivity disorder (ADHD), combined type (Primary)  -     Insulin, Random; Future  -     TSH+Free T4; Future  -     Urinalysis With Culture If Indicated -  -     Vitamin B12 anemia; Future  -     Folate anemia; Future  -     CBC & Differential; Future  -     Comprehensive Metabolic Panel; Future  -     Lipid Panel; Future  -     Hemoglobin A1c; Future  -     PSA DIAGNOSTIC; Future    2. Cerebrovascular accident (CVA), unspecified mechanism  -     Insulin, Random; Future  -     TSH+Free T4; Future  -     Urinalysis With Culture If Indicated -  -     Vitamin B12 anemia; Future  -     Folate anemia; Future  -     CBC & Differential; Future  -     Comprehensive Metabolic Panel; Future  -     Lipid Panel; Future  -     Hemoglobin A1c; Future  -     PSA DIAGNOSTIC; Future    3. TIA (transient ischemic attack)  -     Insulin, Random; Future  -     TSH+Free T4; Future  -     Urinalysis With Culture If Indicated -  -     Vitamin B12 anemia; Future  -     Folate anemia; Future  -     CBC & Differential; Future  -     Comprehensive Metabolic Panel;  Future  -     Lipid Panel; Future  -     Hemoglobin A1c; Future  -     PSA DIAGNOSTIC; Future    4. Elevated PSA  -     Insulin, Random; Future  -     TSH+Free T4; Future  -     Urinalysis With Culture If Indicated -  -     Vitamin B12 anemia; Future  -     Folate anemia; Future  -     CBC & Differential; Future  -     Comprehensive Metabolic Panel; Future  -     Lipid Panel; Future  -     Hemoglobin A1c; Future  -     PSA DIAGNOSTIC; Future    5. Benign prostatic hyperplasia with lower urinary tract symptoms, symptom details unspecified  -     Insulin, Random; Future  -     TSH+Free T4; Future  -     Urinalysis With Culture If Indicated -  -     Vitamin B12 anemia; Future  -     Folate anemia; Future  -     CBC & Differential; Future  -     Comprehensive Metabolic Panel; Future  -     Lipid Panel; Future  -     Hemoglobin A1c; Future  -     PSA DIAGNOSTIC; Future    6. IFG (impaired fasting glucose)  -     Insulin, Random; Future  -     TSH+Free T4; Future  -     Urinalysis With Culture If Indicated -  -     Vitamin B12 anemia; Future  -     Folate anemia; Future  -     CBC & Differential; Future  -     Comprehensive Metabolic Panel; Future  -     Lipid Panel; Future  -     Hemoglobin A1c; Future  -     PSA DIAGNOSTIC; Future    7. Hypertension, essential  -     Insulin, Random; Future  -     TSH+Free T4; Future  -     Urinalysis With Culture If Indicated -  -     Vitamin B12 anemia; Future  -     Folate anemia; Future  -     CBC & Differential; Future  -     Comprehensive Metabolic Panel; Future  -     Lipid Panel; Future  -     Hemoglobin A1c; Future  -     PSA DIAGNOSTIC; Future    Other orders  -     rosuvastatin (Crestor) 10 MG tablet; Take 1 tablet by mouth Daily.  Dispense: 90 tablet; Refill: 3        Annual preventitive exam April 15, 2024--, discussed patient wears a seatbelt, does not smoke or drink, he is active he works every day, encouraged aerobic exercise and activity,    IFG --hemoglobin A1c 5.5  October 2024==== patient's had some low blood sugars feeling weak arms feel tingly heavy at times, discussed, will order an insulin level thyroid levels B12 folate, urinalysis, patient is andrea follow-up with endocrinology as a new appointment coming up in October November 2024,    Hypertension continues lisinopril 10 mg daily,    TIA, approximately 2018,--- continues aspirin daily--Crestor 5 mg daily since October 10, 2022, along with co-Q10    Elevated PSA== back up to 13.8 March 5, 2024,------ elevated to 10.7 October 9, 2023, will refer back to urology, previously -----6.9 oct 2022, --- was referred to Dr. Valadez, MRI of the prostate showed enlarged prostate with some evidence of acute and chronic prostatitis December 6, 2022, ----did not tolerate Flomax, is currently taking finasteride 5 mg daily repeat MRI August 15, 2024 shows no evidence of cancer in the prostate, ultrasound of the kidneys renal ultrasound shows prostamegaly unremarkable kidneys and bladder September 20, 2024===psa =7.8 oct 2024     Inguinal hernia, consider having that repaired along with prostate surgery with Dr. Valadez, October November 2024    ADHD, continues Vyvanse 40 mg a.m., and Adderall  15 mg in the afternoon,    Mixed hyperlipidemia, continues Crestor 5 mg daily, will increase Crestor to 10, LDL cholesterol 79 October 14, 2024    Bilateral partial knee replacements    Preop cardiac clearance for shoulder surgery,=== with nuclear medicine stress testing September 24, 2024 with  ., shows normal myocardial perfusion study no evidence of ischemia normal ejection fraction good exercise tolerance no chest pains low risk study    Shoulder arthritis, rotator cuff issues, sees Dr. Vasquez, had MRI of the shoulder March 2024== patient had recent biceps and shoulder arthroscopic surgery, [facility] 4/20/2024,    BMI is >= 30 and <35. (Class 1 Obesity). The following options were offered after discussion;: exercise  counseling/recommendations    Colonoscopy October 18, 2022 dr otoole               Follow Up   Return in about 6 months (around 4/18/2025).  Patient was given instructions and counseling regarding his condition or for health maintenance advice. Please see specific information pulled into the AVS if appropriate.

## 2024-11-08 ENCOUNTER — LAB (OUTPATIENT)
Dept: LAB | Facility: HOSPITAL | Age: 69
End: 2024-11-08
Payer: COMMERCIAL

## 2024-11-08 DIAGNOSIS — I63.9 CEREBROVASCULAR ACCIDENT (CVA), UNSPECIFIED MECHANISM: ICD-10-CM

## 2024-11-08 DIAGNOSIS — R73.01 IFG (IMPAIRED FASTING GLUCOSE): ICD-10-CM

## 2024-11-08 DIAGNOSIS — F90.2 ATTENTION DEFICIT HYPERACTIVITY DISORDER (ADHD), COMBINED TYPE: ICD-10-CM

## 2024-11-08 DIAGNOSIS — R97.20 ELEVATED PSA: ICD-10-CM

## 2024-11-08 DIAGNOSIS — N40.1 BENIGN PROSTATIC HYPERPLASIA WITH LOWER URINARY TRACT SYMPTOMS, SYMPTOM DETAILS UNSPECIFIED: ICD-10-CM

## 2024-11-08 DIAGNOSIS — G45.9 TIA (TRANSIENT ISCHEMIC ATTACK): ICD-10-CM

## 2024-11-08 DIAGNOSIS — I10 HYPERTENSION, ESSENTIAL: ICD-10-CM

## 2024-11-08 LAB
BACTERIA UR QL AUTO: ABNORMAL /HPF
BILIRUB UR QL STRIP: NEGATIVE
CLARITY UR: CLEAR
COLOR UR: ABNORMAL
GLUCOSE UR STRIP-MCNC: NEGATIVE MG/DL
HGB UR QL STRIP.AUTO: NEGATIVE
HOLD SPECIMEN: NORMAL
HYALINE CASTS UR QL AUTO: ABNORMAL /LPF
KETONES UR QL STRIP: ABNORMAL
LEUKOCYTE ESTERASE UR QL STRIP.AUTO: ABNORMAL
NITRITE UR QL STRIP: NEGATIVE
PH UR STRIP.AUTO: 6 [PH] (ref 5–8)
PROT UR QL STRIP: ABNORMAL
RBC # UR STRIP: ABNORMAL /HPF
REF LAB TEST METHOD: ABNORMAL
SP GR UR STRIP: 1.03 (ref 1–1.03)
SQUAMOUS #/AREA URNS HPF: ABNORMAL /HPF
T4 FREE SERPL-MCNC: 1.31 NG/DL (ref 0.92–1.68)
TSH SERPL DL<=0.05 MIU/L-ACNC: 3.04 UIU/ML (ref 0.27–4.2)
UROBILINOGEN UR QL STRIP: ABNORMAL
VIT B12 BLD-MCNC: 819 PG/ML (ref 211–946)
WBC # UR STRIP: ABNORMAL /HPF

## 2024-11-08 PROCEDURE — 36415 COLL VENOUS BLD VENIPUNCTURE: CPT

## 2024-11-08 PROCEDURE — 84443 ASSAY THYROID STIM HORMONE: CPT

## 2024-11-08 PROCEDURE — 87086 URINE CULTURE/COLONY COUNT: CPT | Performed by: INTERNAL MEDICINE

## 2024-11-08 PROCEDURE — 82607 VITAMIN B-12: CPT

## 2024-11-08 PROCEDURE — 81001 URINALYSIS AUTO W/SCOPE: CPT | Performed by: INTERNAL MEDICINE

## 2024-11-08 PROCEDURE — 84439 ASSAY OF FREE THYROXINE: CPT

## 2024-11-08 PROCEDURE — 83525 ASSAY OF INSULIN: CPT

## 2024-11-10 LAB — BACTERIA SPEC AEROBE CULT: NO GROWTH

## 2024-11-18 DIAGNOSIS — F90.2 ATTENTION DEFICIT HYPERACTIVITY DISORDER (ADHD), COMBINED TYPE: ICD-10-CM

## 2024-11-18 RX ORDER — DEXTROAMPHETAMINE SACCHARATE, AMPHETAMINE ASPARTATE, DEXTROAMPHETAMINE SULFATE AND AMPHETAMINE SULFATE 3.75; 3.75; 3.75; 3.75 MG/1; MG/1; MG/1; MG/1
15 TABLET ORAL DAILY
Qty: 30 TABLET | Refills: 0 | Status: SHIPPED | OUTPATIENT
Start: 2024-11-18 | End: 2024-12-18

## 2024-11-18 RX ORDER — LISDEXAMFETAMINE DIMESYLATE 40 MG/1
40 CAPSULE ORAL EVERY MORNING
Qty: 30 CAPSULE | Refills: 0 | Status: SHIPPED | OUTPATIENT
Start: 2024-11-18 | End: 2024-12-18

## 2024-11-21 LAB — INSULIN SERPL-ACNC: 18 UIU/ML

## 2024-12-18 DIAGNOSIS — F90.2 ATTENTION DEFICIT HYPERACTIVITY DISORDER (ADHD), COMBINED TYPE: ICD-10-CM

## 2024-12-18 RX ORDER — ROSUVASTATIN CALCIUM 5 MG/1
5 TABLET, COATED ORAL DAILY
Qty: 90 TABLET | Refills: 3 | Status: SHIPPED | OUTPATIENT
Start: 2024-12-18

## 2024-12-18 RX ORDER — LISDEXAMFETAMINE DIMESYLATE 40 MG/1
40 CAPSULE ORAL EVERY MORNING
Qty: 30 CAPSULE | Refills: 0 | Status: SHIPPED | OUTPATIENT
Start: 2024-12-18 | End: 2025-01-17

## 2024-12-18 RX ORDER — DEXTROAMPHETAMINE SACCHARATE, AMPHETAMINE ASPARTATE, DEXTROAMPHETAMINE SULFATE AND AMPHETAMINE SULFATE 3.75; 3.75; 3.75; 3.75 MG/1; MG/1; MG/1; MG/1
15 TABLET ORAL DAILY
Qty: 30 TABLET | Refills: 0 | Status: SHIPPED | OUTPATIENT
Start: 2024-12-18 | End: 2025-01-17

## 2025-01-18 DIAGNOSIS — F90.2 ATTENTION DEFICIT HYPERACTIVITY DISORDER (ADHD), COMBINED TYPE: ICD-10-CM

## 2025-01-20 RX ORDER — DEXTROAMPHETAMINE SACCHARATE, AMPHETAMINE ASPARTATE, DEXTROAMPHETAMINE SULFATE AND AMPHETAMINE SULFATE 3.75; 3.75; 3.75; 3.75 MG/1; MG/1; MG/1; MG/1
15 TABLET ORAL DAILY
Qty: 30 TABLET | Refills: 0 | Status: SHIPPED | OUTPATIENT
Start: 2025-01-20 | End: 2025-02-19

## 2025-01-20 RX ORDER — LISDEXAMFETAMINE DIMESYLATE 40 MG/1
40 CAPSULE ORAL EVERY MORNING
Qty: 30 CAPSULE | Refills: 0 | Status: SHIPPED | OUTPATIENT
Start: 2025-01-20 | End: 2025-02-19

## 2025-01-23 ENCOUNTER — OFFICE VISIT (OUTPATIENT)
Dept: INTERNAL MEDICINE | Age: 70
End: 2025-01-23
Payer: COMMERCIAL

## 2025-01-23 VITALS
HEIGHT: 70 IN | TEMPERATURE: 97.8 F | DIASTOLIC BLOOD PRESSURE: 78 MMHG | OXYGEN SATURATION: 94 % | HEART RATE: 87 BPM | BODY MASS INDEX: 30.49 KG/M2 | WEIGHT: 213 LBS | SYSTOLIC BLOOD PRESSURE: 135 MMHG

## 2025-01-23 DIAGNOSIS — J04.0 LARYNGITIS: ICD-10-CM

## 2025-01-23 DIAGNOSIS — R22.9 LOCALIZED SOFT TISSUE SWELLING: Primary | ICD-10-CM

## 2025-01-23 DIAGNOSIS — M89.319 CLAVICLE ENLARGEMENT: ICD-10-CM

## 2025-01-23 PROCEDURE — 99214 OFFICE O/P EST MOD 30 MIN: CPT | Performed by: NURSE PRACTITIONER

## 2025-01-23 RX ORDER — PREDNISONE 20 MG/1
20 TABLET ORAL DAILY
Qty: 14 TABLET | Refills: 0 | Status: SHIPPED | OUTPATIENT
Start: 2025-01-23

## 2025-01-23 RX ORDER — AMOXICILLIN 875 MG/1
875 TABLET, COATED ORAL 2 TIMES DAILY
Qty: 20 TABLET | Refills: 0 | Status: SHIPPED | OUTPATIENT
Start: 2025-01-23 | End: 2025-02-02

## 2025-01-23 NOTE — PATIENT INSTRUCTIONS
Take medications steroid and amoxicillin as instructed if symptoms persist needs to be seen have x-ray and ultrasound of the neck we will call with results

## 2025-01-23 NOTE — PROGRESS NOTES
Chief Complaint  Mass (Pt states he has a lump in his throat and its affecting his speech and breathing pt states he noticed it 2 month ago it comes and goes )    Subjective      Alec Dunlap is a 69 y.o. male who presents to [facility] INTER[facility] MEDIC[facility]     History of Present Illness  The patient presents for evaluation of a lump at the base of his right collarbone throat area.    He reports a recurrent lump at the base of his right collarbone, first noticed approximately 2 months ago. The area is tender to touch. He is uncertain about any changes in the size of the lump but notes that it is occasionally difficult to locate.  He feels like it comes and goes.  He denies any injury.  Denies any fever or chills or sore throat.  He has no known thyroid disorders or family history of such. He has no symptoms of reflux, heartburn, or choking at night.  No dysphagia.  He denies any sore throat but does feel like his voice becomes hoarse and coincides when he notices the lump at the base of his throat more so.  He had a recent thyroid level checked in November which was in normal range.  He does note that approximately 5 months ago he underwent bicep repair on his right , performed by Dr. Baker in [place], was without complication.    He also experiences intermittent hoarseness of voice, which he believes may be associated with the lump. He does not experience any difficulty swallowing, choking sensation, or fever.  He denies sore throat no difficulty or pain with swallowing no fever or chills.    He has been on lisinopril for several years for blood pressure management, which does not cause any throat irritation or cough or swelling.    No shortness of air is not a smoker former smoker quit 1979 ,O2 sats 94% on room air      SOCIAL HISTORY  He does not smoke.    FAMILY HISTORY  He does not have a family history of thyroid disease.    ALLERGIES  The patient has no known  "allergies.    MEDICATIONS  lisinopril         Objective   Vital Signs:   Vitals:    01/23/25 1352   BP: 135/78   Pulse: 87   Temp: 97.8 °F (36.6 °C)   SpO2: 94%   Weight: 96.6 kg (213 lb)   Height: 177.8 cm (70\")     Body mass index is 30.56 kg/m².    Wt Readings from Last 3 Encounters:   01/23/25 96.6 kg (213 lb)   10/18/24 96.2 kg (212 lb)   09/19/24 99.3 kg (219 lb)     BP Readings from Last 3 Encounters:   01/23/25 135/78   10/18/24 119/72   09/19/24 135/84       Health Maintenance   Topic Date Due    AAA SCREEN ONCE  Never done    ZOSTER VACCINE (3 of 3) 03/29/2021    Pneumococcal Vaccine 65+ (2 of 2 - PPSV23 or PCV20) 11/30/2021    INFLUENZA VACCINE  Never done    COVID-19 Vaccine (3 - 2024-25 season) 09/01/2024    ANNUAL PHYSICAL  04/15/2025    BMI FOLLOWUP  04/15/2025    COLORECTAL CANCER SCREENING  10/18/2027    TDAP/TD VACCINES (2 - Td or Tdap) 11/30/2030    HEPATITIS C SCREENING  Completed    HEMOGLOBIN A1C  Discontinued       Past Medical History:   Diagnosis Date    Abnormal ECG     ADHD (attention deficit hyperactivity disorder) 3/01/2021    Colon polyp last visit    Deep vein thrombosis     Erectile dysfunction 01/01/2019    Hypertension     Infectious viral hepatitis i was about 10 years old    Stroke     TIA (transient ischemic attack)      Past Surgical History:   Procedure Laterality Date    CARPAL TUNNEL RELEASE      COLONOSCOPY  7 years    COLONOSCOPY N/A 10/18/2022    Procedure: COLONOSCOPY with APC COAGULTION, AND HOT SNARE POLYPECTOMY;  Surgeon: Jb Escoto MD;  Location: [facility] ENDOSCOPY;  Service: Gastroenterology;  Laterality: N/A;  COLON POLYP, DIVERTICULOSIS, AVM    HERNIA REPAIR      JOINT REPLACEMENT  2018    KNEE ARTHROPLASTY, PARTIAL REPLACEMENT Bilateral     SHOULDER SURGERY      VASECTOMY  1982     Family History   Problem Relation Age of Onset    Heart disease Father     Colon cancer Neg Hx      Social History     Socioeconomic History    Marital status:    Tobacco " Use    Smoking status: Former     Current packs/day: 0.00     Average packs/day: 2.0 packs/day for 7.0 years (14.0 ttl pk-yrs)     Types: Cigarettes     Start date: 1973     Quit date: 1979     Years since quittin.0     Passive exposure: Never    Smokeless tobacco: Never   Vaping Use    Vaping status: Never Used   Substance and Sexual Activity    Alcohol use: Yes     Alcohol/week: 6.0 standard drinks of alcohol     Types: 6 Glasses of wine per week    Drug use: Never    Sexual activity: Not Currently     Partners: Female     Birth control/protection: Condom, Vasectomy       Current Outpatient Medications   Medication Instructions    acyclovir (ZOVIRAX) 400 mg, Oral, 2 Times Daily, Take no more than 5 doses a day.    amoxicillin (AMOXIL) 875 mg, Oral, 2 Times Daily    amphetamine-dextroamphetamine (Adderall) 15 MG tablet 15 mg, Oral, Daily    aspirin 325 mg, Daily    finasteride (PROSCAR) 5 mg, Oral, Daily    glucose blood test strip 1 each, Other, Daily, Use as instructed    lisdexamfetamine (VYVANSE) 40 mg, Oral, Every Morning    lisinopril (PRINIVIL,ZESTRIL) 10 MG tablet TAKE 1 TABLET DAILY    multivitamin with minerals tablet tablet 1 tablet, Daily    nystatin (MYCOSTATIN) 828842 UNIT/GM cream 1 application , Topical, 2 Times Daily    Omega-3 Fatty Acids (fish oil) 1000 MG capsule capsule Daily With Breakfast    predniSONE (DELTASONE) 20 mg, Oral, Daily    rosuvastatin (CRESTOR) 10 mg, Oral, Daily    rosuvastatin (CRESTOR) 5 mg, Oral, Daily    terbinafine (LamISIL AT) 1 % cream 1 application , Topical, 2 Times Daily    triamcinolone (KENALOG) 0.1 % cream 1 Application, Topical, 2 Times Daily       There are no discontinued medications.     Physical Exam  Vitals and nursing note reviewed.   Constitutional:       Appearance: Normal appearance.   HENT:      Head: Normocephalic.   Eyes:      Extraocular Movements: Extraocular movements intact.      Pupils: Pupils are equal, round, and reactive to light.    Neck:      Comments: No lymphadenopathy noted.  At the suprasternal notch mild edema noted on the right with mild tenderness to palpitation, not definite lymphadenopathy and right clavicle appears higher than left.  Surgical scar on right shoulder without erythema edema or drainage.  Diffuse puffiness above the right clavicle lateral neck as well but no definite mass.  Cardiovascular:      Rate and Rhythm: Normal rate and regular rhythm.      Pulses: Normal pulses.   Pulmonary:      Effort: Pulmonary effort is normal.      Breath sounds: Normal breath sounds.   Abdominal:      Palpations: Abdomen is soft.   Musculoskeletal:         General: Normal range of motion.      Cervical back: Normal range of motion.   Skin:     General: Skin is warm and dry.   Neurological:      General: No focal deficit present.      Mental Status: He is alert.   Psychiatric:         Mood and Affect: Mood normal.         Behavior: Behavior normal.         Thought Content: Thought content normal.          Physical Exam         Result Review :  The following data was reviewed by: АННА Olson on 01/23/2025:    Labs  No visits with results within 2 Month(s) from this visit.   Latest known visit with results is:   Lab on 11/08/2024   Component Date Value Ref Range Status    Insulin 11/08/2024 18 (H)  uIU/mL Final    Reference Range:  Pubertal Children and Adults (fasting):  < or = 17    TSH 11/08/2024 3.040  0.270 - 4.200 uIU/mL Final    Free T4 11/08/2024 1.31  0.92 - 1.68 ng/dL Final    Vitamin B-12 11/08/2024 819  211 - 946 pg/mL Final       Imaging  No Images in the past 120 days found..    Results  Laboratory Studies  Thyroid level was normal a couple of months ago.       Procedures       ASSESSMENT & PLAN  Diagnoses and all orders for this visit:    1. Localized soft tissue swelling (Primary)  -     Cancel: XR Shoulder 2+ View Right; Future  -     US Head Neck Soft Tissue; Future  -     predniSONE (DELTASONE) 20 MG tablet;  Take 1 tablet by mouth Daily.  Dispense: 14 tablet; Refill: 0  -     XR Shoulder 2+ View Right; Future    2. Laryngitis  -     amoxicillin (AMOXIL) 875 MG tablet; Take 1 tablet by mouth 2 (Two) Times a Day for 10 days.  Dispense: 20 tablet; Refill: 0    3. Clavicle enlargement         Assessment & Plan  1. Edema in the suprasternal notch and right clavicle area.  The patient's blood pressure and oxygen saturation levels are within normal limits. His thyroid function tests, conducted by Dr. Sewell a few months prior, were also within normal range. The observed edema could potentially be a residual effect of his recent shoulder surgery. An ultrasound of the neck and an x-ray of the shoulder and clavicle will be ordered to further investigate the cause of the edema. He will be started on a low-dose prednisone regimen, to be taken twice daily for a duration of 7 days along with an antibiotic due to his recurrent hoarseness, he also has been on an ACE inhibitor for some time but denies any swelling of his lips tongue throat or persistent cough.  Just intermittent hoarseness, remote history of smoking.     2. Hoarseness of voice.  The hoarseness of voice could be coincidental with the presence of the lump. He is advised to replace his toothbrush and perform warm salt water gargles to alleviate the hoarseness. A low-dose amoxicillin regimen will be initiated, to be taken twice daily for a period of 10 days, to address any potential throat infection.  As well as prednisone for inflammation.     PROCEDURE  The patient underwent biceps surgery on his shoulder approximately 5 months ago.                  FOLLOW UP  No follow-ups on file.  Patient was given instructions and counseling regarding his condition or for health maintenance advice. Please see specific information pulled into the AVS if appropriate.     Patient or patient representative verbalized consent for the use of Ambient Listening during the visit with  Annika  АННА Lara for chart documentation. 1/23/2025  14:48 EST    АННА Olson  01/23/25  14:49 EST

## 2025-01-26 ENCOUNTER — APPOINTMENT (OUTPATIENT)
Facility: HOSPITAL | Age: 70
End: 2025-01-26
Payer: COMMERCIAL

## 2025-01-26 ENCOUNTER — HOSPITAL ENCOUNTER (EMERGENCY)
Facility: HOSPITAL | Age: 70
Discharge: HOME | End: 2025-01-26
Attending: EMERGENCY MEDICINE | Admitting: EMERGENCY MEDICINE
Payer: COMMERCIAL

## 2025-01-26 VITALS
RESPIRATION RATE: 18 BRPM | OXYGEN SATURATION: 100 % | HEART RATE: 77 BPM | DIASTOLIC BLOOD PRESSURE: 60 MMHG | HEIGHT: 70 IN | BODY MASS INDEX: 30.43 KG/M2 | WEIGHT: 212.52 LBS | TEMPERATURE: 98.4 F | SYSTOLIC BLOOD PRESSURE: 150 MMHG

## 2025-01-26 DIAGNOSIS — M79.651 RIGHT THIGH PAIN: Primary | ICD-10-CM

## 2025-01-26 LAB
BH CV LOWER VASCULAR LEFT COMMON FEMORAL AUGMENT: NORMAL
BH CV LOWER VASCULAR LEFT COMMON FEMORAL COMPETENT: NORMAL
BH CV LOWER VASCULAR LEFT COMMON FEMORAL COMPRESS: NORMAL
BH CV LOWER VASCULAR LEFT COMMON FEMORAL PHASIC: NORMAL
BH CV LOWER VASCULAR LEFT COMMON FEMORAL SPONT: NORMAL
BH CV LOWER VASCULAR RIGHT COMMON FEMORAL AUGMENT: NORMAL
BH CV LOWER VASCULAR RIGHT COMMON FEMORAL COMPETENT: NORMAL
BH CV LOWER VASCULAR RIGHT COMMON FEMORAL COMPRESS: NORMAL
BH CV LOWER VASCULAR RIGHT COMMON FEMORAL PHASIC: NORMAL
BH CV LOWER VASCULAR RIGHT COMMON FEMORAL SPONT: NORMAL
BH CV LOWER VASCULAR RIGHT DISTAL FEMORAL COMPRESS: NORMAL
BH CV LOWER VASCULAR RIGHT GASTRONEMIUS COMPRESS: NORMAL
BH CV LOWER VASCULAR RIGHT GREATER SAPH AK COMPRESS: NORMAL
BH CV LOWER VASCULAR RIGHT GREATER SAPH BK COMPRESS: NORMAL
BH CV LOWER VASCULAR RIGHT LESSER SAPH COMPRESS: NORMAL
BH CV LOWER VASCULAR RIGHT MID FEMORAL AUGMENT: NORMAL
BH CV LOWER VASCULAR RIGHT MID FEMORAL COMPETENT: NORMAL
BH CV LOWER VASCULAR RIGHT MID FEMORAL COMPRESS: NORMAL
BH CV LOWER VASCULAR RIGHT MID FEMORAL PHASIC: NORMAL
BH CV LOWER VASCULAR RIGHT MID FEMORAL SPONT: NORMAL
BH CV LOWER VASCULAR RIGHT PERONEAL COMPRESS: NORMAL
BH CV LOWER VASCULAR RIGHT POPLITEAL AUGMENT: NORMAL
BH CV LOWER VASCULAR RIGHT POPLITEAL COMPETENT: NORMAL
BH CV LOWER VASCULAR RIGHT POPLITEAL COMPRESS: NORMAL
BH CV LOWER VASCULAR RIGHT POPLITEAL PHASIC: NORMAL
BH CV LOWER VASCULAR RIGHT POPLITEAL SPONT: NORMAL
BH CV LOWER VASCULAR RIGHT POSTERIOR TIBIAL COMPRESS: NORMAL
BH CV LOWER VASCULAR RIGHT PROXIMAL FEMORAL COMPRESS: NORMAL
BH CV LOWER VASCULAR RIGHT SAPHENOFEMORAL JUNCTION COMPRESS: NORMAL
BH CV VAS PRELIMINARY FINDINGS SCRIPTING: 1

## 2025-01-26 PROCEDURE — 93971 EXTREMITY STUDY: CPT | Performed by: SURGERY

## 2025-01-26 PROCEDURE — 99284 EMERGENCY DEPT VISIT MOD MDM: CPT

## 2025-01-26 PROCEDURE — 93971 EXTREMITY STUDY: CPT

## 2025-01-26 RX ORDER — METHOCARBAMOL 750 MG/1
750 TABLET, FILM COATED ORAL 3 TIMES DAILY PRN
Qty: 15 TABLET | Refills: 0 | Status: SHIPPED | OUTPATIENT
Start: 2025-01-26

## 2025-01-26 RX ORDER — METHOCARBAMOL 750 MG/1
750 TABLET, FILM COATED ORAL 3 TIMES DAILY PRN
Qty: 15 TABLET | Refills: 0 | Status: SHIPPED | OUTPATIENT
Start: 2025-01-26 | End: 2025-01-26

## 2025-01-26 RX ORDER — OXYCODONE HYDROCHLORIDE 5 MG/1
5 TABLET ORAL ONCE
Status: COMPLETED | OUTPATIENT
Start: 2025-01-26 | End: 2025-01-26

## 2025-01-26 RX ADMIN — OXYCODONE HYDROCHLORIDE 5 MG: 5 TABLET ORAL at 10:29

## 2025-01-26 NOTE — ED PROVIDER NOTES
Time: 10:23 AM EST  Date of encounter:  1/26/2025  Independent Historian/Clinical History and Information was obtained by:   Patient and Family    History is limited by: N/A    Chief Complaint: Leg pain      History of Present Illness:  Patient is a 69 y.o. year old male who presents to the emergency department for evaluation of leg pain.  Patient presents to the emergency department today for right anterior thigh pain.  The pain started around 11 PM last night but got significantly worse throughout the Night into this morning.  Patient denies any recent injury.  He denies believing that he would have had any strain to the area.  Patient is not having any swelling or redness of the area.  Patient does have history of DVT that was postoperative many years ago and is not currently on any blood thinners but does take a daily baby aspirin.  Patient last week traveled to [place] which was a 10-hour round trip.  Patient is not a smoker.  Patient denies chest pain or shortness of air.      Patient Care Team  Primary Care Provider: Scott Sewell MD    Past Medical History:     Allergies   Allergen Reactions    Gabapentin Rash    Pregabalin Rash     Past Medical History:   Diagnosis Date    Abnormal ECG     ADHD (attention deficit hyperactivity disorder) 3/01/2021    Colon polyp last visit    Deep vein thrombosis     Erectile dysfunction 01/01/2019    Hypertension     Infectious viral hepatitis i was about 10 years old    Stroke     TIA (transient ischemic attack)      Past Surgical History:   Procedure Laterality Date    CARPAL TUNNEL RELEASE      COLONOSCOPY  7 years    COLONOSCOPY N/A 10/18/2022    Procedure: COLONOSCOPY with APC COAGULTION, AND HOT SNARE POLYPECTOMY;  Surgeon: Jb Escoto MD;  Location: [facility] ENDOSCOPY;  Service: Gastroenterology;  Laterality: N/A;  COLON POLYP, DIVERTICULOSIS, AVM    HERNIA REPAIR      JOINT REPLACEMENT  2018    KNEE ARTHROPLASTY, PARTIAL REPLACEMENT Bilateral      SHOULDER SURGERY      VASECTOMY  1982     Family History   Problem Relation Age of Onset    Heart disease Father     Colon cancer Neg Hx        Home Medications:  Prior to Admission medications    Medication Sig Start Date End Date Taking? Authorizing Provider   acyclovir (Zovirax) 400 MG tablet Take 1 tablet by mouth 2 (Two) Times a Day. Take no more than 5 doses a day. 4/15/24   Scott Sewell MD   amoxicillin (AMOXIL) 875 MG tablet Take 1 tablet by mouth 2 (Two) Times a Day for 10 days. 1/23/25 2/2/25  Annika Carrillo APRN   amphetamine-dextroamphetamine (Adderall) 15 MG tablet Take 1 tablet by mouth Daily for 30 days. 1/20/25 2/19/25  Scott Sewell MD   aspirin 325 MG tablet Take 1 tablet by mouth Daily.    ProviderHyacinth MD   finasteride (PROSCAR) 5 MG tablet Take 1 tablet by mouth Daily. 8/23/24   Gio Valadez MD   glucose blood test strip 1 each by Other route Daily. Use as instructed 9/20/24   Scott Sewell MD   lisdexamfetamine (Vyvanse) 40 MG capsule Take 1 capsule by mouth Every Morning for 30 days 1/20/25 2/19/25  Scott Sewell MD   lisinopril (PRINIVIL,ZESTRIL) 10 MG tablet TAKE 1 TABLET DAILY 6/21/24   Scott Sewell MD   multivitamin with minerals tablet tablet Take 1 tablet by mouth Daily.    ProviderHyacinth MD   nystatin (MYCOSTATIN) 912559 UNIT/GM cream Apply 1 application topically to the appropriate area as directed 2 (Two) Times a Day. 4/3/23   Scott Sewell MD   Omega-3 Fatty Acids (fish oil) 1000 MG capsule capsule Take  by mouth Daily With Breakfast.    ProviderHyacinth MD   predniSONE (DELTASONE) 20 MG tablet Take 1 tablet by mouth Daily. 1/23/25   Annika Carrillo APRN   rosuvastatin (Crestor) 10 MG tablet Take 1 tablet by mouth Daily. 10/18/24   Scott Sewell MD   rosuvastatin (CRESTOR) 5 MG tablet TAKE 1 TABLET DAILY 12/18/24   Scott Sewell MD   terbinafine (LamISIL AT) 1 %  "cream Apply 1 application topically to the appropriate area as directed 2 (Two) Times a Day. 23   Scott Sewell MD   triamcinolone (KENALOG) 0.1 % cream Apply 1 Application topically to the appropriate area as directed 2 (Two) Times a Day. 24   Scott Sewell MD        Social History:   Social History     Tobacco Use    Smoking status: Former     Current packs/day: 0.00     Average packs/day: 2.0 packs/day for 7.0 years (14.0 ttl pk-yrs)     Types: Cigarettes     Start date: 1973     Quit date: 1979     Years since quittin.1     Passive exposure: Never    Smokeless tobacco: Never   Vaping Use    Vaping status: Never Used   Substance Use Topics    Alcohol use: Yes     Alcohol/week: 6.0 standard drinks of alcohol     Types: 6 Glasses of wine per week    Drug use: Never         Review of Systems:  Review of Systems   Respiratory:  Negative for shortness of breath.    Cardiovascular:  Negative for chest pain.   Musculoskeletal:  Positive for arthralgias and myalgias. Negative for joint swelling.   Skin:  Negative for color change and rash.        Physical Exam:  /60   Pulse 77   Temp 98.4 °F (36.9 °C) (Oral)   Resp 18   Ht 177.8 cm (70\")   Wt 96.4 kg (212 lb 8.4 oz)   SpO2 100%   BMI 30.49 kg/m²     Physical Exam  Vitals and nursing note reviewed.   Constitutional:       General: He is not in acute distress.     Appearance: Normal appearance. He is normal weight. He is not ill-appearing, toxic-appearing or diaphoretic.   HENT:      Head: Normocephalic and atraumatic.      Nose: Nose normal.   Eyes:      Conjunctiva/sclera: Conjunctivae normal.   Cardiovascular:      Rate and Rhythm: Normal rate and regular rhythm.      Pulses:           Dorsalis pedis pulses are 2+ on the right side and 2+ on the left side.   Pulmonary:      Effort: Pulmonary effort is normal.      Breath sounds: Normal breath sounds.   Abdominal:      General: Abdomen is flat. There is no " distension.   Musculoskeletal:         General: Normal range of motion.      Cervical back: Normal range of motion and neck supple.      Right upper leg: Tenderness present. No swelling, edema, deformity, lacerations or bony tenderness.      Right knee: Normal.        Legs:    Skin:     General: Skin is warm and dry.   Neurological:      General: No focal deficit present.      Mental Status: He is alert and oriented to person, place, and time.   Psychiatric:         Mood and Affect: Mood normal.         Behavior: Behavior normal.         Thought Content: Thought content normal.         Judgment: Judgment normal.                  Medical Decision Making:    Comorbidities that affect care:    Hypertension, DVT, CVA    External Notes reviewed:    Previous Clinic Note: Patient last seen by family medicine on 1-      The following orders were placed and all results were independently analyzed by me:  No orders of the defined types were placed in this encounter.      Medications Given in the Emergency Department:  Medications   oxyCODONE (ROXICODONE) immediate release tablet 5 mg (5 mg Oral Given 1/26/25 1029)        ED Course:    ED Course as of 01/26/25 1306   Sun Jan 26, 2025   1300 Vascular ultrasound reports patient is negative for DVT [MD]      ED Course User Index  [MD] eZchariah Sanders PA-C       Labs:    Lab Results (last 24 hours)       ** No results found for the last 24 hours. **             Imaging:    No Radiology Exams Resulted Within Past 24 Hours      Differential Diagnosis and Discussion:    Extremity Pain: Differential diagnosis includes but is not limited to soft tissue sprain, tendonitis, tendon injury, dislocation, fracture, deep vein thrombosis, arterial insufficiency, osteoarthritis, bursitis, and ligamentous damage.    PROCEDURES:    Ultrasound was performed in the emergency department and the ultrasound impression was interpreted by me.     No orders to display        Procedures    MDM  Number of Diagnoses or Management Options  Right thigh pain  Diagnosis management comments: Patient presented to the emergency department today for evaluation of right thigh pain.  Duplex ultrasound was obtained as this was an acute onset without any injury but was negative for DVT.  Will discharge patient home with Robaxin and have him complete gentle range of motion exercises at home and apply heat to the area.  Return to the emergency department guidelines provided.       Amount and/or Complexity of Data Reviewed  Tests in the radiology section of CPT®: ordered and reviewed    Risk of Complications, Morbidity, and/or Mortality  Presenting problems: moderate  Diagnostic procedures: low  Management options: low    Patient Progress  Patient progress: stable       Patient Care Considerations:    I considered x-ray however patient had no trauma to the leg      Consultants/Shared Management Plan:    None    Social Determinants of Health:    Patient is independent, reliable, and has access to care.       Disposition and Care Coordination:    Discharged: The patient is suitable and stable for discharge with no need for consideration of admission.    I have explained the patient´s condition, diagnoses and treatment plan based on the information available to me at this time. I have answered questions and addressed any concerns. The patient has a good  understanding of the patient´s diagnosis, condition, and treatment plan as can be expected at this point. The vital signs have been stable. The patient´s condition is stable and appropriate for discharge from the emergency department.      The patient will pursue further outpatient evaluation with the primary care physician or other designated or consulting physician as outlined in the discharge instructions. They are agreeable to this plan of care and follow-up instructions have been explained in detail. The patient has received these instructions in  written format and has expressed an understanding of the discharge instructions. The patient is aware that any significant change in condition or worsening of symptoms should prompt an immediate return to this or the closest emergency department or call to 911.  I have explained discharge medications and the need for follow up with the patient/caretakers. This was also printed in the discharge instructions. Patient was discharged with the following medications and follow up:      Medication List        New Prescriptions      methocarbamol 750 MG tablet  Commonly known as: ROBAXIN  Take 1 tablet by mouth 3 (Three) Times a Day As Needed for Muscle Spasms.               Where to Get Your Medications        These medications were sent to [facility] - [place] - [address] - [phone]  - [phone] FX  [address], [place] [address]      Phone: [phone]   methocarbamol 750 MG tablet      Scott Sewell MD  [address]  [address]  [place] [address]  [phone]             Final diagnoses:   Right thigh pain        ED Disposition       ED Disposition   Discharge    Condition   Stable    Comment   --               This medical record created using voice recognition software.             Zechariah Sanders PA-C  01/26/25 [id number]

## 2025-01-26 NOTE — DISCHARGE INSTRUCTIONS
Your ultrasound completed in the emergency department today is negative for any DVT.  Try to complete gentle stretches of the thigh.  Apply warm heat to the area 15 to 20 minutes at a time 4-5 times a day as well.  Take Robaxin as needed for symptom control.  Follow-up if symptoms not improving within 1 week.  Return to the emergency department for new or worsening symptoms.

## 2025-01-27 ENCOUNTER — HOSPITAL ENCOUNTER (OUTPATIENT)
Dept: GENERAL RADIOLOGY | Facility: HOSPITAL | Age: 70
Discharge: HOME | End: 2025-01-27
Payer: COMMERCIAL

## 2025-01-27 ENCOUNTER — TELEPHONE (OUTPATIENT)
Dept: INTERNAL MEDICINE | Age: 70
End: 2025-01-27

## 2025-01-27 ENCOUNTER — OFFICE VISIT (OUTPATIENT)
Dept: INTERNAL MEDICINE | Age: 70
End: 2025-01-27
Payer: COMMERCIAL

## 2025-01-27 VITALS
DIASTOLIC BLOOD PRESSURE: 92 MMHG | WEIGHT: 212.52 LBS | BODY MASS INDEX: 30.43 KG/M2 | SYSTOLIC BLOOD PRESSURE: 160 MMHG | HEIGHT: 70 IN | HEART RATE: 95 BPM | TEMPERATURE: 98.6 F | OXYGEN SATURATION: 95 %

## 2025-01-27 DIAGNOSIS — M79.604 RIGHT LEG PAIN: ICD-10-CM

## 2025-01-27 DIAGNOSIS — R22.9 LOCALIZED SOFT TISSUE SWELLING: ICD-10-CM

## 2025-01-27 DIAGNOSIS — I10 HYPERTENSION, ESSENTIAL: ICD-10-CM

## 2025-01-27 DIAGNOSIS — M79.604 RIGHT LEG PAIN: Primary | ICD-10-CM

## 2025-01-27 DIAGNOSIS — G45.9 TIA (TRANSIENT ISCHEMIC ATTACK): ICD-10-CM

## 2025-01-27 PROBLEM — M25.561 ACUTE PAIN OF RIGHT KNEE: Status: ACTIVE | Noted: 2025-01-27

## 2025-01-27 PROCEDURE — 73560 X-RAY EXAM OF KNEE 1 OR 2: CPT

## 2025-01-27 PROCEDURE — 99214 OFFICE O/P EST MOD 30 MIN: CPT | Performed by: INTERNAL MEDICINE

## 2025-01-27 PROCEDURE — 73030 X-RAY EXAM OF SHOULDER: CPT

## 2025-01-27 PROCEDURE — 73552 X-RAY EXAM OF FEMUR 2/>: CPT

## 2025-01-27 RX ORDER — DICLOFENAC SODIUM 75 MG/1
TABLET, DELAYED RELEASE ORAL
Qty: 20 TABLET | Refills: 0 | Status: SHIPPED | OUTPATIENT
Start: 2025-01-27

## 2025-01-27 RX ORDER — HYDROCODONE BITARTRATE AND ACETAMINOPHEN 7.5; 325 MG/1; MG/1
1 TABLET ORAL EVERY 8 HOURS PRN
Qty: 24 TABLET | Refills: 0 | Status: SHIPPED | OUTPATIENT
Start: 2025-01-27

## 2025-01-27 NOTE — ASSESSMENT & PLAN NOTE
Patient is followed by Dr. Gilman and Dr. Sewell, blood pressures just spuriously elevated as of his 1/25 OV secondary to pain, he is on low-dose lisinopril and stable to continue same.

## 2025-01-27 NOTE — ASSESSMENT & PLAN NOTE
As per chief complaint, this occurred after he was was getting up from kneeling, he was on a knee pad.  He was not in that position for prolonged timeframe.  Knifelike pain through the knee.  Eventually had muscle spasm in the thigh, he was concerned that he had a blood clot, so he was seen in the ER.    As noted, patient had venous Doppler in the ER which was negative for clot.    On exam he does not have any lumbar spine tenderness, no tenderness in the hip, he has negative straight leg raise bilaterally.    The knee is without any effusion, there is no laxity nor point tenderness.    He does have point tenderness in his thigh.    Will get a x-ray of the right lower extremity, treat with anti-inflammatory, agree with recommendations from the ER in regards to utilizing heating pad etc.

## 2025-01-27 NOTE — PROGRESS NOTES
"Chief Complaint  Knee Pain (Pt was putting in face plate and was kneeling on knee pad , went to get up and felt like a knife went thru the knee. The pain passed about 6 hours later felt like he was having a bad muscle spasm. Worse pain he ever felt . Went to hospital because he thought he had a blood clot. Hospital said no clot and sent him home. Pain is still pretty intense.)    Subjective      Alec Dunlap presents to [facility]    Knee Pain       History present illness:  Patient 69-year-old male, patient of Dr. Amador, with underlying hypertension, hyperlipidemia, etc., who was seen by Johana last week and given prednisone for URI, seen in the ER just yesterday for right thigh pain who had negative venous Doppler and was given Robaxin and ROM exercises, instructed to use heat etc.  He is being seen today 1/27/2025 apparently in follow-up from the ER visit given persistent pain.    Review of Systems   Constitutional:  Negative for appetite change, fatigue and fever.   HENT:  Negative for congestion and ear pain.    Eyes:  Negative for blurred vision.   Respiratory:  Negative for cough, chest tightness, shortness of breath and wheezing.    Cardiovascular:  Negative for chest pain, palpitations and leg swelling.   Gastrointestinal:  Negative for abdominal pain.   Genitourinary:  Negative for difficulty urinating, dysuria and hematuria.   Musculoskeletal:  Negative for arthralgias and gait problem.   Skin:  Negative for skin lesions.   Neurological:  Negative for syncope, memory problem and confusion.   Psychiatric/Behavioral:  Negative for self-injury and depressed mood.        Objective   Vital Signs:   /92   Pulse 95   Temp 98.6 °F (37 °C) (Temporal)   Ht 177.8 cm (70\")   Wt 96.4 kg (212 lb 8.4 oz)   SpO2 95%   BMI 30.49 kg/m²           Physical Exam  Vitals and nursing note reviewed.   Constitutional:       General: He is not in acute distress.     Appearance: " Normal appearance. He is not toxic-appearing.   HENT:      Head: Atraumatic.      Right Ear: External ear normal.      Left Ear: External ear normal.      Nose: Nose normal.      Mouth/Throat:      Mouth: Mucous membranes are moist.   Eyes:      General:         Right eye: No discharge.         Left eye: No discharge.      Extraocular Movements: Extraocular movements intact.      Pupils: Pupils are equal, round, and reactive to light.   Cardiovascular:      Rate and Rhythm: Normal rate and regular rhythm.      Pulses: Normal pulses.      Heart sounds: Normal heart sounds. No murmur heard.     No gallop.   Pulmonary:      Effort: Pulmonary effort is normal. No respiratory distress.      Breath sounds: No wheezing, rhonchi or rales.   Abdominal:      General: There is no distension.      Palpations: Abdomen is soft. There is no mass.      Tenderness: There is no abdominal tenderness. There is no guarding.   Musculoskeletal:         General: No swelling or tenderness.      Cervical back: No tenderness.      Right lower leg: No edema.      Left lower leg: No edema.   Skin:     General: Skin is warm and dry.      Findings: No rash.   Neurological:      General: No focal deficit present.      Mental Status: He is alert and oriented to person, place, and time. Mental status is at baseline.      Motor: No weakness.      Gait: Gait normal.   Psychiatric:         Mood and Affect: Mood normal.         Thought Content: Thought content normal.          Result Review   The following data was reviewed by: Chuck Her MD on 01/27/2025:  [] Laboratory  [] Microbiology  [] Radiology  [] EKG/telemetry  [] Cardiology/Vascular  [] Pathology  [] Old records             Assessment and Plan   Diagnoses and all orders for this visit:    1. Right leg pain (Primary)  Assessment & Plan:  As per chief complaint, this occurred after he was was getting up from kneeling, he was on a knee pad.  He was not in that position for prolonged timeframe.   Knifelike pain through the knee.  Eventually had muscle spasm in the thigh, he was concerned that he had a blood clot, so he was seen in the ER.    As noted, patient had venous Doppler in the ER which was negative for clot.    On exam he does not have any lumbar spine tenderness, no tenderness in the hip, he has negative straight leg raise bilaterally.    The knee is without any effusion, there is no laxity nor point tenderness.    He does have point tenderness in his thigh.    Will get a x-ray of the right lower extremity, treat with anti-inflammatory, agree with recommendations from the ER in regards to utilizing heating pad etc.    Orders:  -     XR Femur 2 View Right (In Office)  -     XR Knee 1 or 2 View Right; Future  -     HYDROcodone-acetaminophen (Norco) 7.5-325 MG per tablet; Take 1 tablet by mouth Every 8 (Eight) Hours As Needed for Moderate Pain.  Dispense: 24 tablet; Refill: 0    2. TIA (transient ischemic attack)  Assessment & Plan:  Recommend patient back his full dose aspirin down to 81 mg, particularly while on anti-inflammatories.      3. Hypertension, essential  Assessment & Plan:  Patient is followed by Dr. Gilman and Dr. Sewell, blood pressures just spuriously elevated as of his 1/25 OV secondary to pain, he is on low-dose lisinopril and stable to continue same.      Other orders  -     diclofenac (VOLTAREN) 75 MG EC tablet; After completing the prednisone, take 1 tablet twice daily with food.  Dispense: 20 tablet; Refill: 0                      Follow Up   No follow-ups on file.  Patient was given instructions and counseling regarding his condition or for health maintenance advice. Please see specific information pulled into the AVS if appropriate.     Total Time Spent:   minutes     This time includes time spent by me in the following activities: preparing for the visit, reviewing extensive past medical history and tests, performing a medically appropriate examination and/or evaluation,  counseling and educating the patient and/or caregivers, ordering medications, tests, or procedures, referring and/or communicating with other health care professionals and documenting information in the medical record all on this date of service.

## 2025-01-27 NOTE — TELEPHONE ENCOUNTER
HUB may relay message to patient.     ----- Message from Annika Carrillo sent at 1/24/2025  4:38 PM EST -----  His shoulder clavicle x-ray was negative no masses nothing of concern I am still awaiting the ultrasound of his neck and as soon as I get those results I will let him know.

## 2025-01-27 NOTE — ASSESSMENT & PLAN NOTE
Recommend patient back his full dose aspirin down to 81 mg, particularly while on anti-inflammatories.

## 2025-01-29 ENCOUNTER — TELEPHONE (OUTPATIENT)
Dept: INTERNAL MEDICINE | Age: 70
End: 2025-01-29
Payer: COMMERCIAL

## 2025-01-29 NOTE — TELEPHONE ENCOUNTER
HUB may relay message to patient    ----- Message from Annika Carrillo sent at 1/29/2025  1:18 PM EST -----  Can you let him know his ultrasound did not show anything.  It could be some soft tissue swelling from his prior surgery continue to monitor it if it persist or increase in size come back and see Dr. Sewell

## 2025-01-29 NOTE — TELEPHONE ENCOUNTER
HUB may relay message to patient    ----- Message from Annika Carrillo sent at 1/29/2025  8:03 AM EST -----  Can you let him know the x-ray of the shoulder/clavicle does not show anything surprisingly it does show some postop surgical changes and some degenerative joint disease likely arthritis but it does not account for the mass that he feels in his throat, can have ultrasound as planned and will see what that shows  ----- Message -----  From: Interface, Rad Results [place] In  Sent: 1/28/2025   3:08 PM EST  To: АННА Olson

## 2025-02-17 DIAGNOSIS — F90.2 ATTENTION DEFICIT HYPERACTIVITY DISORDER (ADHD), COMBINED TYPE: ICD-10-CM

## 2025-02-17 RX ORDER — LISDEXAMFETAMINE DIMESYLATE 40 MG/1
40 CAPSULE ORAL EVERY MORNING
Qty: 30 CAPSULE | Refills: 0 | Status: SHIPPED | OUTPATIENT
Start: 2025-02-17 | End: 2025-03-19

## 2025-02-17 RX ORDER — DEXTROAMPHETAMINE SACCHARATE, AMPHETAMINE ASPARTATE, DEXTROAMPHETAMINE SULFATE AND AMPHETAMINE SULFATE 3.75; 3.75; 3.75; 3.75 MG/1; MG/1; MG/1; MG/1
15 TABLET ORAL DAILY
Qty: 30 TABLET | Refills: 0 | Status: SHIPPED | OUTPATIENT
Start: 2025-02-17 | End: 2025-03-19

## 2025-03-24 ENCOUNTER — READMISSION MANAGEMENT (OUTPATIENT)
Dept: CALL CENTER | Facility: HOSPITAL | Age: 70
End: 2025-03-24
Payer: COMMERCIAL

## 2025-03-24 ENCOUNTER — SPECIALTY PHARMACY (OUTPATIENT)
Dept: PHARMACY | Facility: HOSPITAL | Age: 70
End: 2025-03-24
Payer: COMMERCIAL

## 2025-03-24 DIAGNOSIS — C92.00 ACUTE MYELOID LEUKEMIA NOT HAVING ACHIEVED REMISSION: Primary | ICD-10-CM

## 2025-03-25 ENCOUNTER — TRANSITIONAL CARE MANAGEMENT TELEPHONE ENCOUNTER (OUTPATIENT)
Dept: CALL CENTER | Facility: HOSPITAL | Age: 70
End: 2025-03-25
Payer: COMMERCIAL

## 2025-03-25 ENCOUNTER — TELEPHONE (OUTPATIENT)
Dept: UROLOGY | Age: 70
End: 2025-03-25
Payer: COMMERCIAL

## 2025-03-25 DIAGNOSIS — N40.1 BENIGN PROSTATIC HYPERPLASIA WITH LOWER URINARY TRACT SYMPTOMS, SYMPTOM DETAILS UNSPECIFIED: ICD-10-CM

## 2025-03-25 DIAGNOSIS — F90.2 ATTENTION DEFICIT HYPERACTIVITY DISORDER (ADHD), COMBINED TYPE: ICD-10-CM

## 2025-03-25 DIAGNOSIS — N40.1 BENIGN PROSTATIC HYPERPLASIA WITH LOWER URINARY TRACT SYMPTOMS, SYMPTOM DETAILS UNSPECIFIED: Primary | ICD-10-CM

## 2025-03-25 DIAGNOSIS — R73.01 IFG (IMPAIRED FASTING GLUCOSE): ICD-10-CM

## 2025-03-25 RX ORDER — LISDEXAMFETAMINE DIMESYLATE 40 MG/1
40 CAPSULE ORAL EVERY MORNING
Qty: 30 CAPSULE | Refills: 0 | Status: SHIPPED | OUTPATIENT
Start: 2025-03-25 | End: 2025-04-24

## 2025-03-25 NOTE — OUTREACH NOTE
Prep Survey      Flowsheet Row Responses   [facility] facility patient discharged from? Non-[facility]   Is LACE score < 7 ? Non-BH Discharge   Eligibility [facility]   Date of Admission 03/13/25   Date of Discharge 03/24/25   Discharge Disposition Home or Self Care   Discharge diagnosis Thrombocytopenia   Does the patient have one of the following disease processes/diagnoses(primary or secondary)? Other   Does the patient have Home health ordered? No   Is there a DME ordered? No   Prep survey completed? Yes            CLIFF SUBRAMANIAN - Registered Nurse

## 2025-03-25 NOTE — OUTREACH NOTE
Call Center TCM Note      Flowsheet Row Responses   [facility] facility patient discharged from? Non-  [[place]]   Does the patient have one of the following disease processes/diagnoses(primary or secondary)? Other   TCM attempt successful? No   Unsuccessful attempts Attempt 1            INDU MORALES - Registered Nurse    3/25/2025, 10:37 EDT

## 2025-03-25 NOTE — TELEPHONE ENCOUNTER
Patient wife called, she stated that the pharmacy is not refilling the Finastaride, I told her that I would send a message to see if the patient can get a refill or if he needs to be seen. Please call and advise

## 2025-03-25 NOTE — OUTREACH NOTE
Call Center TCM Note      Flowsheet Row Responses   [facility] patient discharged from? Non-  [[place]]   Does the patient have one of the following disease processes/diagnoses(primary or secondary)? Other   TCM attempt successful? Yes   Call start time 1535   Call end time 1542   Discharge diagnosis Thrombocytopenia   Meds reviewed with patient/caregiver? Yes   Is the patient having any side effects they believe may be caused by any medication additions or changes? No   Does the patient have all medications ordered at discharge? Yes   Is the patient taking all medications as directed (includes completed medication regime)? Yes   Comments HOSP DC FU appt 3/31/25 315 pm.   Does the patient have an appointment with their PCP within 7-14 days of discharge? Yes   Has home health visited the patient within 72 hours of discharge? N/A   Psychosocial issues? No   Did the patient receive a copy of their discharge instructions? Yes   Nursing interventions Reviewed instructions with patient   What is the patient's perception of their health status since discharge? Improving   Is the patient/caregiver able to teach back signs and symptoms related to disease process for when to call PCP? Yes   Is the patient/caregiver able to teach back signs and symptoms related to disease process for when to call 911? Yes   Is the patient/caregiver able to teach back the hierarchy of who to call/visit for symptoms/problems? PCP, Specialist, Home health nurse, Urgent Care, ED, 911 Yes   TCM call completed? Yes   Wrap up additional comments wife reports Pt has meds as ordered. She had questions on the ABT 3 x week. on which days to take. She will calrify with the . Pt has appt this week. and PCP appt has been set.   Call end time 1542            INDU MORALES - Registered Nurse    3/25/2025, 15:43 EDT

## 2025-03-25 NOTE — TELEPHONE ENCOUNTER
PATIENT'S WIFE CALLED.  SHE SAID HER  WAS DIAGNOSED WITH ACUTE LEUKEMIA 13 DAYS AGO.  HE WAS IN THE HOSPITAL [facility] ON [address] ON THE 5TH FLOOR, ONCOLOGY UNIT, FOR 12 DAYS.  HE WAS BEING TREATED BY DR. QUINONES.    THEY GAVE HIM A DOSE OF FINASTERIDE EVERY DAYS HE WAS IN THE HOSPITAL.  SHE SAID IT WAS PROBABLY BECAUSE IT WAS ON HIS MEDICATION LIST.    SHE SAID THE ORIGINAL PRESCRIPTION FOR FINASTERIDE WAS FROM DR. FAJARDO ON 08/23/24.  PATIENT FILLED ONCE, BUT DIDN'T REFILL.    WIFE WANTS TO KNOW IF DR. FAJARDO TOLD HER  TO STOP TAKING THE FINASTERIDE FOR ANY REASON, OR TO FIND OUT IF IT IS SOMETHING HE NEEDS TO START AGAIN.     CALL WIFE, KATY, [phone].

## 2025-03-26 RX ORDER — FINASTERIDE 5 MG/1
5 TABLET, FILM COATED ORAL DAILY
Qty: 90 TABLET | Refills: 4 | Status: SHIPPED | OUTPATIENT
Start: 2025-03-26

## 2025-03-26 NOTE — TELEPHONE ENCOUNTER
Notified pt spouse that I spoke with [facility] in regards to the Finasteride Rx. She was able to fill it for 30 days with 4 refills.

## 2025-03-27 NOTE — PROGRESS NOTES
Specialty Pharmacy Patient Management Program  Program Disenrollment      Aelc Dunlap is seen by their provider for AML (Acute Myeloid Leukemia). Patient was previously enrolled in the Oncology Patient Management program offered by [facility] for Venclexta (venetoclax).      Disenrolling patient today because the patient has decided not to transfer care from [facility] to [facility] for his AML per Sraah, nurse at [facility].     Miya Nava PharmD  Oncology Clinical Specialty Pharmacist   3/27/2025  10:13 EDT

## 2025-04-14 DIAGNOSIS — F90.2 ATTENTION DEFICIT HYPERACTIVITY DISORDER (ADHD), COMBINED TYPE: ICD-10-CM

## 2025-04-15 RX ORDER — DEXTROAMPHETAMINE SACCHARATE, AMPHETAMINE ASPARTATE, DEXTROAMPHETAMINE SULFATE AND AMPHETAMINE SULFATE 3.75; 3.75; 3.75; 3.75 MG/1; MG/1; MG/1; MG/1
15 TABLET ORAL DAILY
Qty: 30 TABLET | Refills: 0 | Status: SHIPPED | OUTPATIENT
Start: 2025-04-15 | End: 2025-05-15

## 2025-04-27 DIAGNOSIS — F90.2 ATTENTION DEFICIT HYPERACTIVITY DISORDER (ADHD), COMBINED TYPE: ICD-10-CM

## 2025-04-28 RX ORDER — LISDEXAMFETAMINE DIMESYLATE 40 MG/1
40 CAPSULE ORAL EVERY MORNING
Qty: 30 CAPSULE | Refills: 0 | Status: SHIPPED | OUTPATIENT
Start: 2025-04-28 | End: 2025-05-28

## 2025-05-13 DIAGNOSIS — F90.2 ATTENTION DEFICIT HYPERACTIVITY DISORDER (ADHD), COMBINED TYPE: ICD-10-CM

## 2025-05-14 RX ORDER — DEXTROAMPHETAMINE SACCHARATE, AMPHETAMINE ASPARTATE, DEXTROAMPHETAMINE SULFATE AND AMPHETAMINE SULFATE 3.75; 3.75; 3.75; 3.75 MG/1; MG/1; MG/1; MG/1
15 TABLET ORAL DAILY
Qty: 30 TABLET | Refills: 0 | Status: SHIPPED | OUTPATIENT
Start: 2025-05-14 | End: 2025-06-13

## 2025-06-02 RX ORDER — NYSTATIN 100000 U/G
1 CREAM TOPICAL 2 TIMES DAILY
Qty: 3 G | Refills: 1 | Status: SHIPPED | OUTPATIENT
Start: 2025-06-02

## 2025-06-04 DIAGNOSIS — F90.2 ATTENTION DEFICIT HYPERACTIVITY DISORDER (ADHD), COMBINED TYPE: ICD-10-CM

## 2025-06-04 RX ORDER — LISDEXAMFETAMINE DIMESYLATE 40 MG/1
40 CAPSULE ORAL EVERY MORNING
Qty: 30 CAPSULE | Refills: 0 | Status: SHIPPED | OUTPATIENT
Start: 2025-06-04 | End: 2025-07-04

## 2025-06-16 RX ORDER — LISINOPRIL 10 MG/1
10 TABLET ORAL DAILY
Qty: 90 TABLET | Refills: 3 | Status: SHIPPED | OUTPATIENT
Start: 2025-06-16

## 2025-06-18 DIAGNOSIS — F90.2 ATTENTION DEFICIT HYPERACTIVITY DISORDER (ADHD), COMBINED TYPE: ICD-10-CM

## 2025-06-18 RX ORDER — DEXTROAMPHETAMINE SACCHARATE, AMPHETAMINE ASPARTATE, DEXTROAMPHETAMINE SULFATE AND AMPHETAMINE SULFATE 3.75; 3.75; 3.75; 3.75 MG/1; MG/1; MG/1; MG/1
15 TABLET ORAL DAILY
Qty: 30 TABLET | Refills: 0 | Status: SHIPPED | OUTPATIENT
Start: 2025-06-18 | End: 2025-07-18

## 2025-06-23 DIAGNOSIS — N40.1 BENIGN PROSTATIC HYPERPLASIA WITH LOWER URINARY TRACT SYMPTOMS, SYMPTOM DETAILS UNSPECIFIED: ICD-10-CM

## 2025-06-24 RX ORDER — FINASTERIDE 5 MG/1
5 TABLET, FILM COATED ORAL DAILY
Qty: 90 TABLET | Refills: 4 | OUTPATIENT
Start: 2025-06-24

## 2025-07-08 DIAGNOSIS — F90.2 ATTENTION DEFICIT HYPERACTIVITY DISORDER (ADHD), COMBINED TYPE: ICD-10-CM

## 2025-07-08 RX ORDER — LISDEXAMFETAMINE DIMESYLATE 40 MG/1
40 CAPSULE ORAL EVERY MORNING
Qty: 30 CAPSULE | Refills: 0 | Status: SHIPPED | OUTPATIENT
Start: 2025-07-08 | End: 2025-08-07

## 2025-07-15 DIAGNOSIS — F90.2 ATTENTION DEFICIT HYPERACTIVITY DISORDER (ADHD), COMBINED TYPE: ICD-10-CM

## 2025-07-15 RX ORDER — DEXTROAMPHETAMINE SACCHARATE, AMPHETAMINE ASPARTATE, DEXTROAMPHETAMINE SULFATE AND AMPHETAMINE SULFATE 3.75; 3.75; 3.75; 3.75 MG/1; MG/1; MG/1; MG/1
15 TABLET ORAL DAILY
Qty: 30 TABLET | Refills: 0 | Status: SHIPPED | OUTPATIENT
Start: 2025-07-15 | End: 2025-08-14

## 2025-08-07 DIAGNOSIS — F90.2 ATTENTION DEFICIT HYPERACTIVITY DISORDER (ADHD), COMBINED TYPE: ICD-10-CM

## 2025-08-07 RX ORDER — LISDEXAMFETAMINE DIMESYLATE 40 MG/1
40 CAPSULE ORAL EVERY MORNING
Qty: 30 CAPSULE | Refills: 0 | Status: SHIPPED | OUTPATIENT
Start: 2025-08-07 | End: 2025-09-06

## 2025-08-14 ENCOUNTER — TELEPHONE (OUTPATIENT)
Dept: INTERNAL MEDICINE | Age: 70
End: 2025-08-14
Payer: COMMERCIAL

## 2025-08-14 RX ORDER — SCOPOLAMINE 1 MG/3D
1 PATCH, EXTENDED RELEASE TRANSDERMAL
Qty: 4 PATCH | Refills: 0 | Status: SHIPPED | OUTPATIENT
Start: 2025-08-14

## 2025-08-15 DIAGNOSIS — Z79.899 MEDICATION MANAGEMENT: Primary | ICD-10-CM

## 2025-08-18 DIAGNOSIS — Z79.899 MEDICATION MANAGEMENT: Primary | ICD-10-CM

## (undated) DEVICE — THE SINGLE USE ETRAP – POLYP TRAP IS USED FOR SUCTION RETRIEVAL OF ENDOSCOPICALLY REMOVED POLYPS.: Brand: ETRAP

## (undated) DEVICE — SOLIDIFIER LIQLOC PLS 1500CC BT

## (undated) DEVICE — APC PROBE 2200 A, SINGLE USE OD 2.3MM/6.9FR; L. 2.2M/7.2FT: Brand: ERBE

## (undated) DEVICE — Device

## (undated) DEVICE — SOL IRRG H2O PL/BG 1000ML STRL

## (undated) DEVICE — SNAR E/S POLYP SNAREMASTER OVL/10MM 2.8X2300MM YEL

## (undated) DEVICE — Device: Brand: DEFENDO AIR/WATER/SUCTION AND BIOPSY VALVE

## (undated) DEVICE — PAD GRND REM POLYHESIVE A/ DISP